# Patient Record
Sex: FEMALE | Race: WHITE | NOT HISPANIC OR LATINO | Employment: FULL TIME | URBAN - METROPOLITAN AREA
[De-identification: names, ages, dates, MRNs, and addresses within clinical notes are randomized per-mention and may not be internally consistent; named-entity substitution may affect disease eponyms.]

---

## 2017-01-25 ENCOUNTER — OFFICE VISIT (OUTPATIENT)
Dept: CARDIOLOGY | Facility: MEDICAL CENTER | Age: 62
End: 2017-01-25
Payer: COMMERCIAL

## 2017-01-25 VITALS
DIASTOLIC BLOOD PRESSURE: 70 MMHG | OXYGEN SATURATION: 98 % | SYSTOLIC BLOOD PRESSURE: 114 MMHG | HEIGHT: 66 IN | WEIGHT: 160 LBS | BODY MASS INDEX: 25.71 KG/M2 | HEART RATE: 58 BPM

## 2017-01-25 DIAGNOSIS — E05.90 HYPERTHYROIDISM: ICD-10-CM

## 2017-01-25 DIAGNOSIS — G89.29 CHRONIC BILATERAL LOW BACK PAIN WITHOUT SCIATICA: ICD-10-CM

## 2017-01-25 DIAGNOSIS — I48.0 PAROXYSMAL ATRIAL FIBRILLATION (HCC): ICD-10-CM

## 2017-01-25 DIAGNOSIS — M54.50 CHRONIC BILATERAL LOW BACK PAIN WITHOUT SCIATICA: ICD-10-CM

## 2017-01-25 PROCEDURE — 99214 OFFICE O/P EST MOD 30 MIN: CPT | Performed by: NURSE PRACTITIONER

## 2017-01-25 RX ORDER — TRAZODONE HYDROCHLORIDE 50 MG/1
50 TABLET ORAL NIGHTLY
COMMUNITY
End: 2020-04-23

## 2017-01-25 RX ORDER — FLECAINIDE ACETATE 50 MG/1
100 TABLET ORAL PRN
Qty: 60 TAB | Refills: 11 | Status: SHIPPED | OUTPATIENT
Start: 2017-01-25 | End: 2020-04-23

## 2017-01-25 RX ORDER — ASPIRIN 81 MG/1
81 TABLET, CHEWABLE ORAL DAILY
Qty: 100 TAB | Refills: 3 | Status: ON HOLD | COMMUNITY
Start: 2017-01-25 | End: 2020-04-23

## 2017-01-25 ASSESSMENT — ENCOUNTER SYMPTOMS
WEAKNESS: 0
MYALGIAS: 0
ABDOMINAL PAIN: 0
DIZZINESS: 0
PND: 0
COUGH: 0
ORTHOPNEA: 0
BACK PAIN: 1
PALPITATIONS: 1
SHORTNESS OF BREATH: 1
CLAUDICATION: 0

## 2017-01-25 NOTE — PROGRESS NOTES
Subjective:   Emily Peter is a 61 y.o. female who presents today for annual follow-up on paroxysmal atrial fibrillation. She was last seen by Dr. Elam in November 2015. At that time, he had her take flecanide when necessary episodes of atrial fibrillation. The last episode that she had was in the summer of 2016 at which time her heart rate was 170 and she felt short of breath. She noted that taking only 50 mg the flecanide was not enough to convert her back to normal rhythm. She feels that she needs to take 100-200 mg which is what he recommended.    She's not had any further episodes of atrial fib. She admits to increased stress right now as she works for the [employer] which is about to go into session. She is not exercising regularly but is active at work.    She has hyperthyroidism which is being followed by endocrinologist in [place].    She feels generally well and denies any shortness of breath, chest discomfort or ankle edema. She complains of some chronic back pain for which she takes Aleve.    Past Medical History   Diagnosis Date   • Atrial fibrillation (CMS-HCC)      distant hx of   • Hypothyroid      History reviewed. No pertinent past surgical history.  History reviewed. No pertinent family history.  History   Smoking status   • Never Smoker    Smokeless tobacco   • Never Used     Allergies   Allergen Reactions   • Cardizem Unspecified     Can cause the heart to stop for 2 seconds      Outpatient Encounter Prescriptions as of 1/25/2017   Medication Sig Dispense Refill   • CycloSPORINE (RESTASIS OP) 1 Drop by Ophthalmic route 2 Times a Day.     • trazodone (DESYREL) 50 MG Tab Take 50 mg by mouth every evening. Or PRN USE     • flecainide (TAMBOCOR) 50 MG tablet Take 2 Tabs by mouth as needed. For atrial fibrillation.  May take up to 200 mg twice daily as needed. 60 Tab 11   • aspirin (ASA) 81 MG Chew Tab chewable tablet Take 1 Tab by mouth every day. 100 Tab 3   • nadolol  "(CORGARD) 40 MG Tab TAKE ONE TABLET BY MOUTH DAILY 90 Tab 0   • Levothyroxine Sodium (TIROSINT) 150 MCG CAPS Take 150 mcg by mouth every day.     • liothyronine (CYTOMEL) 25 MCG TABS Take 12.5 mcg by mouth every day.     • [DISCONTINUED] erythromycin 5 MG/GM Ointment Place 1 Application in left eye 6 Times a Day. Apply small amount twice a day (Patient not taking: Reported on 1/25/2017) 1 Tube 1     No facility-administered encounter medications on file as of 1/25/2017.     Review of Systems   Constitutional: Negative for malaise/fatigue.   Respiratory: Positive for shortness of breath (with exertion when in A fib). Negative for cough.    Cardiovascular: Positive for palpitations (when in A fib). Negative for chest pain, orthopnea, claudication, leg swelling and PND.   Gastrointestinal: Negative for abdominal pain.   Musculoskeletal: Positive for back pain (chronic.). Negative for myalgias.   Neurological: Negative for dizziness and weakness.        Objective:   /70 mmHg  Pulse 58  Ht 1.676 m (5' 5.98\")  Wt 72.576 kg (160 lb)  BMI 25.84 kg/m2  SpO2 98%    Physical Exam   Constitutional: She is oriented to person, place, and time. She appears well-developed and well-nourished.   HENT:   Head: Normocephalic.   Eyes: Conjunctivae are normal.   Neck: No JVD present. No thyromegaly present.   Cardiovascular: Normal rate, regular rhythm, S1 normal, S2 normal and normal heart sounds.  Exam reveals no gallop, no S3, no S4 and no friction rub.    No murmur heard.  Pulmonary/Chest: Effort normal and breath sounds normal. No respiratory distress. She has no wheezes. She has no rales.   Abdominal: Soft. Bowel sounds are normal. She exhibits no distension. There is no tenderness.   Musculoskeletal: She exhibits no edema.   Neurological: She is alert and oriented to person, place, and time.   Skin: Skin is warm and dry.   Psychiatric: She has a normal mood and affect.     No recent lab.    Assessment:     1. Paroxysmal " atrial fibrillation (CMS-HCC)  flecainide (TAMBOCOR) 50 MG tablet   2. Hyperthyroidism     3. Chronic bilateral low back pain without sciatica         Medical Decision Making:  Today's Assessment / Status / Plan:     Paroxysmal atrial fibrillation: no recent episodes. I will refill the flecanide and she can take anywhere from  mg as needed when she gets and atrial fibrillation. She knows that if she is in this rhythm for an extended period of time to go to the emergency room for potential cardioversion. She had been taking aspirin 3 days a week. I recommend aspirin 81 mg daily. She will take this with food.    Hyperthyroidism: Be contributing to her episodes of atrial fibrillation. She will follow with her endocrinologist.    Low back pain: I recommended she try Tylenol first and then Aleve. She will take the Aleve with food.    She is stable enough to follow up in one year with Dr. Elam or myself. She will follow-up sooner if she has frequent atrial fibrillation.    Collaborating Provider: Dr. Veronique Booth.

## 2017-01-25 NOTE — Clinical Note
[facility]   [address] [address]  [place] [address]  Phone: [phone]  Fax: [phone]              Emily Peter  1955    Encounter Date: 1/25/2017    YANI Francis          PROGRESS NOTE:  Subjective:   Emily Peter is a 61 y.o. female who presents today for annual follow-up on paroxysmal atrial fibrillation. She was last seen by Dr. Elam in November 2015. At that time, he had her take flecanide when necessary episodes of atrial fibrillation. The last episode that she had was in the summer of 2016 at which time her heart rate was 170 and she felt short of breath. She noted that taking only 50 mg the flecanide was not enough to convert her back to normal rhythm. She feels that she needs to take 100-200 mg which is what he recommended.    She's not had any further episodes of atrial fib. She admits to increased stress right now as she works for the [employer] which is about to go into session. She is not exercising regularly but is active at work.    She has hyperthyroidism which is being followed by endocrinologist in [place].    She feels generally well and denies any shortness of breath, chest discomfort or ankle edema. She complains of some chronic back pain for which she takes Aleve.    Past Medical History   Diagnosis Date   • Atrial fibrillation (CMS-HCC)      distant hx of   • Hypothyroid      History reviewed. No pertinent past surgical history.  History reviewed. No pertinent family history.  History   Smoking status   • Never Smoker    Smokeless tobacco   • Never Used     Allergies   Allergen Reactions   • Cardizem Unspecified     Can cause the heart to stop for 2 seconds      Outpatient Encounter Prescriptions as of 1/25/2017   Medication Sig Dispense Refill   • CycloSPORINE (RESTASIS OP) 1 Drop by Ophthalmic route 2 Times a Day.     • trazodone (DESYREL) 50 MG Tab Take 50 mg by mouth every evening. Or  "PRN USE     • flecainide (TAMBOCOR) 50 MG tablet Take 2 Tabs by mouth as needed. For atrial fibrillation.  May take up to 200 mg twice daily as needed. 60 Tab 11   • aspirin (ASA) 81 MG Chew Tab chewable tablet Take 1 Tab by mouth every day. 100 Tab 3   • nadolol (CORGARD) 40 MG Tab TAKE ONE TABLET BY MOUTH DAILY 90 Tab 0   • Levothyroxine Sodium (TIROSINT) 150 MCG CAPS Take 150 mcg by mouth every day.     • liothyronine (CYTOMEL) 25 MCG TABS Take 12.5 mcg by mouth every day.     • [DISCONTINUED] erythromycin 5 MG/GM Ointment Place 1 Application in left eye 6 Times a Day. Apply small amount twice a day (Patient not taking: Reported on 1/25/2017) 1 Tube 1     No facility-administered encounter medications on file as of 1/25/2017.     Review of Systems   Constitutional: Negative for malaise/fatigue.   Respiratory: Positive for shortness of breath (with exertion when in A fib). Negative for cough.    Cardiovascular: Positive for palpitations (when in A fib). Negative for chest pain, orthopnea, claudication, leg swelling and PND.   Gastrointestinal: Negative for abdominal pain.   Musculoskeletal: Positive for back pain (chronic.). Negative for myalgias.   Neurological: Negative for dizziness and weakness.        Objective:   /70 mmHg  Pulse 58  Ht 1.676 m (5' 5.98\")  Wt 72.576 kg (160 lb)  BMI 25.84 kg/m2  SpO2 98%    Physical Exam   Constitutional: She is oriented to person, place, and time. She appears well-developed and well-nourished.   HENT:   Head: Normocephalic.   Eyes: Conjunctivae are normal.   Neck: No JVD present. No thyromegaly present.   Cardiovascular: Normal rate, regular rhythm, S1 normal, S2 normal and normal heart sounds.  Exam reveals no gallop, no S3, no S4 and no friction rub.    No murmur heard.  Pulmonary/Chest: Effort normal and breath sounds normal. No respiratory distress. She has no wheezes. She has no rales.   Abdominal: Soft. Bowel sounds are normal. She exhibits no distension. " There is no tenderness.   Musculoskeletal: She exhibits no edema.   Neurological: She is alert and oriented to person, place, and time.   Skin: Skin is warm and dry.   Psychiatric: She has a normal mood and affect.     No recent lab.    Assessment:     1. Paroxysmal atrial fibrillation (CMS-HCC)  flecainide (TAMBOCOR) 50 MG tablet   2. Hyperthyroidism     3. Chronic bilateral low back pain without sciatica         Medical Decision Making:  Today's Assessment / Status / Plan:     Paroxysmal atrial fibrillation: no recent episodes. I will refill the flecanide and she can take anywhere from  mg as needed when she gets and atrial fibrillation. She knows that if she is in this rhythm for an extended period of time to go to the emergency room for potential cardioversion. She had been taking aspirin 3 days a week. I recommend aspirin 81 mg daily. She will take this with food.    Hyperthyroidism: Be contributing to her episodes of atrial fibrillation. She will follow with her endocrinologist.    Low back pain: I recommended she try Tylenol first and then Aleve. She will take the Aleve with food.    She is stable enough to follow up in one year with Dr. Elam or myself. She will follow-up sooner if she has frequent atrial fibrillation.    Collaborating Provider: Dr. Veronique Booth.        No Recipients

## 2017-01-25 NOTE — MR AVS SNAPSHOT
"        Emily Peter   2017 2:20 PM   Office Visit   MRN: [id number]    Department:  [facility]   Dept Phone:  [phone]    Description:  Female : 1955   Provider:  YANI Francis           Reason for Visit     Follow-Up Patient is here for a follow up and reaction to AFIB medication. When has bouts with AFIB, unknown at times and when coming out of it, patient becomes bradycardia at times. Symptoms waxes and wanes.      Allergies as of 2017     Allergen Noted Reactions    Cardizem 09/15/2015   Unspecified    Can cause the heart to stop for 2 seconds       You were diagnosed with     Paroxysmal atrial fibrillation (CMS-HCC)   [822846]       Hyperthyroidism   [683865]         Vital Signs     Blood Pressure Pulse Height Weight Body Mass Index Oxygen Saturation    114/70 mmHg 58 1.676 m (5' 5.98\") 72.576 kg (160 lb) 25.84 kg/m2 98%    Smoking Status                   Never Smoker            Basic Information     Date Of Birth Sex Race Ethnicity Preferred Language    1955 Female White Non- English      Problem List              ICD-10-CM Priority Class Noted - Resolved    Hyperthyroidism E05.90   2015 - Present    A-fib (CMS-[facility]) I48.91   2015 - Present      Health Maintenance        Date Due Completion Dates    IMM DTaP/Tdap/Td Vaccine (1 - Tdap) 1975 ---    PAP SMEAR 1977 ---    COLONOSCOPY 2006 ---    IMM ZOSTER VACCINE 2016 ---    IMM INFLUENZA (1) 2016    MAMMOGRAM 3/8/2017 3/8/2016, 2014, 11/3/2011, 2009, 2009, 2008, 2008, 2006, 2005            Current Immunizations     Influenza TIV (IM) 2014      Below and/or attached are the medications your provider expects you to take. Review all of your home medications and newly ordered medications with your provider and/or pharmacist. Follow medication instructions as directed by your provider and/or pharmacist. Please keep your medication " list with you and share with your provider. Update the information when medications are discontinued, doses are changed, or new medications (including over-the-counter products) are added; and carry medication information at all times in the event of emergency situations     Allergies:  CARDIZEM - Unspecified               Medications  Valid as of: January 25, 2017 -  3:36 PM    Generic Name Brand Name Tablet Size Instructions for use    Aspirin (Chew Tab) ASA 81 MG Take 1 Tab by mouth every day.        CycloSPORINE   1 Drop by Ophthalmic route 2 Times a Day.        Flecainide Acetate (Tab) TAMBOCOR 50 MG Take 2 Tabs by mouth as needed. For atrial fibrillation.  May take up to 200 mg twice daily as needed.        Levothyroxine Sodium (Cap) Levothyroxine Sodium 150 MCG Take 150 mcg by mouth every day.        Liothyronine Sodium (Tab) CYTOMEL 25 MCG Take 12.5 mcg by mouth every day.        Nadolol (Tab) CORGARD 40 MG TAKE ONE TABLET BY MOUTH DAILY        TraZODone HCl (Tab) DESYREL 50 MG Take 50 mg by mouth every evening. Or PRN USE        .                 Medicines prescribed today were sent to:     [facility] #[id number] - [facility] [address] [address]    Phone: [phone] Fax: [phone]    Open 24 Hours?: No      Medication refill instructions:       If your prescription bottle indicates you have medication refills left, it is not necessary to call your provider’s office. Please contact your pharmacy and they will refill your medication.    If your prescription bottle indicates you do not have any refills left, you may request refills at any time through one of the following ways: The online [employer] system (except Urgent Care), by calling your provider’s office, or by asking your pharmacy to contact your provider’s office with a refill request. Medication refills are processed only during regular business hours and may not be available until the next business  day. Your provider may request additional information or to have a follow-up visit with you prior to refilling your medication.   *Please Note: Medication refills are assigned a new Rx number when refilled electronically. Your pharmacy may indicate that no refills were authorized even though a new prescription for the same medication is available at the pharmacy. Please request the medicine by name with the pharmacy before contacting your provider for a refill.           [employer] Access Code: Activation code not generated  Current [email or web address] Status: Active

## 2017-02-25 ENCOUNTER — HOSPITAL ENCOUNTER (OUTPATIENT)
Dept: LAB | Facility: MEDICAL CENTER | Age: 62
End: 2017-02-25
Attending: INTERNAL MEDICINE
Payer: COMMERCIAL

## 2017-02-25 LAB
ALBUMIN SERPL BCP-MCNC: 4.3 G/DL (ref 3.2–4.9)
ALBUMIN/GLOB SERPL: 1.4 G/DL
ALP SERPL-CCNC: 64 U/L (ref 30–99)
ALT SERPL-CCNC: 22 U/L (ref 2–50)
ANION GAP SERPL CALC-SCNC: 9 MMOL/L (ref 0–11.9)
AST SERPL-CCNC: 20 U/L (ref 12–45)
BASOPHILS # BLD AUTO: 0.03 K/UL (ref 0–0.12)
BASOPHILS NFR BLD AUTO: 0.6 % (ref 0–1.8)
BILIRUB SERPL-MCNC: 0.5 MG/DL (ref 0.1–1.5)
BUN SERPL-MCNC: 10 MG/DL (ref 8–22)
CALCIUM SERPL-MCNC: 9.9 MG/DL (ref 8.5–10.5)
CHLORIDE SERPL-SCNC: 104 MMOL/L (ref 96–112)
CHOLEST SERPL-MCNC: 199 MG/DL (ref 100–199)
CO2 SERPL-SCNC: 30 MMOL/L (ref 20–33)
CREAT SERPL-MCNC: 0.67 MG/DL (ref 0.5–1.4)
EOSINOPHIL # BLD: 0.09 K/UL (ref 0–0.51)
EOSINOPHIL NFR BLD AUTO: 1.7 % (ref 0–6.9)
ERYTHROCYTE [DISTWIDTH] IN BLOOD BY AUTOMATED COUNT: 37.1 FL (ref 35.9–50)
GLOBULIN SER CALC-MCNC: 3.1 G/DL (ref 1.9–3.5)
GLUCOSE SERPL-MCNC: 92 MG/DL (ref 65–99)
HCT VFR BLD AUTO: 46.3 % (ref 37–47)
HDLC SERPL-MCNC: 51 MG/DL
HGB BLD-MCNC: 15.3 G/DL (ref 12–16)
IMM GRANULOCYTES # BLD AUTO: 0.01 K/UL (ref 0–0.11)
IMM GRANULOCYTES NFR BLD AUTO: 0.2 % (ref 0–0.9)
LDLC SERPL CALC-MCNC: 133 MG/DL
LYMPHOCYTES # BLD: 2.03 K/UL (ref 1–4.8)
LYMPHOCYTES NFR BLD AUTO: 37.5 % (ref 22–41)
MCH RBC QN AUTO: 27.5 PG (ref 27–33)
MCHC RBC AUTO-ENTMCNC: 33 G/DL (ref 33.6–35)
MCV RBC AUTO: 83.3 FL (ref 81.4–97.8)
MONOCYTES # BLD: 0.33 K/UL (ref 0–0.85)
MONOCYTES NFR BLD AUTO: 6.1 % (ref 0–13.4)
NEUTROPHILS # BLD: 2.93 K/UL (ref 2–7.15)
NEUTROPHILS NFR BLD AUTO: 53.9 % (ref 44–72)
NRBC # BLD AUTO: 0 K/UL
NRBC BLD-RTO: 0 /100 WBC
PLATELET # BLD AUTO: 317 K/UL (ref 164–446)
PMV BLD AUTO: 10.6 FL (ref 9–12.9)
POTASSIUM SERPL-SCNC: 4.3 MMOL/L (ref 3.6–5.5)
PROT SERPL-MCNC: 7.4 G/DL (ref 6–8.2)
RBC # BLD AUTO: 5.56 M/UL (ref 4.2–5.4)
SODIUM SERPL-SCNC: 143 MMOL/L (ref 135–145)
T3FREE SERPL-MCNC: 5.43 PG/ML (ref 2.4–4.2)
T4 FREE SERPL-MCNC: 2.13 NG/DL (ref 0.53–1.43)
THYROPEROXIDASE AB SERPL-ACNC: 2.1 IU/ML (ref 0–9)
TRIGL SERPL-MCNC: 74 MG/DL (ref 0–149)
TSH SERPL DL<=0.005 MIU/L-ACNC: <0.015 UIU/ML (ref 0.3–3.7)
WBC # BLD AUTO: 5.4 K/UL (ref 4.8–10.8)

## 2017-02-25 PROCEDURE — 84445 ASSAY OF TSI GLOBULIN: CPT

## 2017-02-25 PROCEDURE — 80053 COMPREHEN METABOLIC PANEL: CPT

## 2017-02-25 PROCEDURE — 80061 LIPID PANEL: CPT

## 2017-02-25 PROCEDURE — 85025 COMPLETE CBC W/AUTO DIFF WBC: CPT

## 2017-02-25 PROCEDURE — 84481 FREE ASSAY (FT-3): CPT

## 2017-02-25 PROCEDURE — 36415 COLL VENOUS BLD VENIPUNCTURE: CPT

## 2017-02-25 PROCEDURE — 84439 ASSAY OF FREE THYROXINE: CPT

## 2017-02-25 PROCEDURE — 84443 ASSAY THYROID STIM HORMONE: CPT

## 2017-02-25 PROCEDURE — 86376 MICROSOMAL ANTIBODY EACH: CPT

## 2017-03-02 LAB — TSI ACT/NOR SER: 83 %

## 2017-03-22 DIAGNOSIS — I48.0 PAF (PAROXYSMAL ATRIAL FIBRILLATION) (HCC): ICD-10-CM

## 2017-03-22 RX ORDER — NADOLOL 40 MG/1
40 TABLET ORAL DAILY
Qty: 90 TAB | Refills: 3 | Status: SHIPPED | OUTPATIENT
Start: 2017-03-22 | End: 2018-05-30 | Stop reason: SDUPTHER

## 2017-07-20 ENCOUNTER — HOSPITAL ENCOUNTER (OUTPATIENT)
Dept: LAB | Facility: MEDICAL CENTER | Age: 62
End: 2017-07-20
Attending: INTERNAL MEDICINE
Payer: COMMERCIAL

## 2017-07-20 LAB
ALBUMIN SERPL BCP-MCNC: 4.3 G/DL (ref 3.2–4.9)
ALBUMIN/GLOB SERPL: 1.5 G/DL
ALP SERPL-CCNC: 70 U/L (ref 30–99)
ALT SERPL-CCNC: 20 U/L (ref 2–50)
ANION GAP SERPL CALC-SCNC: 7 MMOL/L (ref 0–11.9)
AST SERPL-CCNC: 19 U/L (ref 12–45)
BASOPHILS # BLD AUTO: 0.9 % (ref 0–1.8)
BASOPHILS # BLD: 0.05 K/UL (ref 0–0.12)
BILIRUB SERPL-MCNC: 0.5 MG/DL (ref 0.1–1.5)
BUN SERPL-MCNC: 11 MG/DL (ref 8–22)
CALCIUM SERPL-MCNC: 9.9 MG/DL (ref 8.5–10.5)
CHLORIDE SERPL-SCNC: 105 MMOL/L (ref 96–112)
CHOLEST SERPL-MCNC: 186 MG/DL (ref 100–199)
CO2 SERPL-SCNC: 28 MMOL/L (ref 20–33)
CREAT SERPL-MCNC: 0.62 MG/DL (ref 0.5–1.4)
EOSINOPHIL # BLD AUTO: 0.14 K/UL (ref 0–0.51)
EOSINOPHIL NFR BLD: 2.5 % (ref 0–6.9)
ERYTHROCYTE [DISTWIDTH] IN BLOOD BY AUTOMATED COUNT: 39.2 FL (ref 35.9–50)
EST. AVERAGE GLUCOSE BLD GHB EST-MCNC: 108 MG/DL
GFR SERPL CREATININE-BSD FRML MDRD: >60 ML/MIN/1.73 M 2
GLOBULIN SER CALC-MCNC: 2.9 G/DL (ref 1.9–3.5)
GLUCOSE SERPL-MCNC: 80 MG/DL (ref 65–99)
HBA1C MFR BLD: 5.4 % (ref 0–5.6)
HCT VFR BLD AUTO: 45 % (ref 37–47)
HDLC SERPL-MCNC: 55 MG/DL
HGB BLD-MCNC: 14.8 G/DL (ref 12–16)
IMM GRANULOCYTES # BLD AUTO: 0.02 K/UL (ref 0–0.11)
IMM GRANULOCYTES NFR BLD AUTO: 0.4 % (ref 0–0.9)
LDLC SERPL CALC-MCNC: 112 MG/DL
LYMPHOCYTES # BLD AUTO: 1.85 K/UL (ref 1–4.8)
LYMPHOCYTES NFR BLD: 32.8 % (ref 22–41)
MCH RBC QN AUTO: 27.6 PG (ref 27–33)
MCHC RBC AUTO-ENTMCNC: 32.9 G/DL (ref 33.6–35)
MCV RBC AUTO: 84 FL (ref 81.4–97.8)
MONOCYTES # BLD AUTO: 0.38 K/UL (ref 0–0.85)
MONOCYTES NFR BLD AUTO: 6.7 % (ref 0–13.4)
NEUTROPHILS # BLD AUTO: 3.2 K/UL (ref 2–7.15)
NEUTROPHILS NFR BLD: 56.7 % (ref 44–72)
NRBC # BLD AUTO: 0 K/UL
NRBC BLD AUTO-RTO: 0 /100 WBC
PLATELET # BLD AUTO: 305 K/UL (ref 164–446)
PMV BLD AUTO: 11.1 FL (ref 9–12.9)
POTASSIUM SERPL-SCNC: 4.1 MMOL/L (ref 3.6–5.5)
PROT SERPL-MCNC: 7.2 G/DL (ref 6–8.2)
RBC # BLD AUTO: 5.36 M/UL (ref 4.2–5.4)
SODIUM SERPL-SCNC: 140 MMOL/L (ref 135–145)
T3FREE SERPL-MCNC: 3.8 PG/ML (ref 2.4–4.2)
T4 FREE SERPL-MCNC: 1.38 NG/DL (ref 0.53–1.43)
THYROPEROXIDASE AB SERPL-ACNC: 3.2 IU/ML (ref 0–9)
TRIGL SERPL-MCNC: 93 MG/DL (ref 0–149)
TSH SERPL DL<=0.005 MIU/L-ACNC: <0.015 UIU/ML (ref 0.3–3.7)
WBC # BLD AUTO: 5.6 K/UL (ref 4.8–10.8)

## 2017-07-20 PROCEDURE — 86376 MICROSOMAL ANTIBODY EACH: CPT

## 2017-07-20 PROCEDURE — 84443 ASSAY THYROID STIM HORMONE: CPT

## 2017-07-20 PROCEDURE — 85025 COMPLETE CBC W/AUTO DIFF WBC: CPT

## 2017-07-20 PROCEDURE — 80061 LIPID PANEL: CPT

## 2017-07-20 PROCEDURE — 80053 COMPREHEN METABOLIC PANEL: CPT

## 2017-07-20 PROCEDURE — 36415 COLL VENOUS BLD VENIPUNCTURE: CPT

## 2017-07-20 PROCEDURE — 84439 ASSAY OF FREE THYROXINE: CPT

## 2017-07-20 PROCEDURE — 84481 FREE ASSAY (FT-3): CPT

## 2017-07-20 PROCEDURE — 83036 HEMOGLOBIN GLYCOSYLATED A1C: CPT

## 2017-08-22 ENCOUNTER — TELEPHONE (OUTPATIENT)
Dept: CARDIOLOGY | Facility: MEDICAL CENTER | Age: 62
End: 2017-08-22

## 2017-08-22 NOTE — TELEPHONE ENCOUNTER
----- Message from Kristen Lopez sent at 8/22/2017 11:29 AM PDT -----  Regarding: patient feels like her heart is racing  KOTA/Nessa      Patient wants to discuss her medications. She says her heart feels like it is racing, and she thinks it's from her medication. She can be reached at work at [phone].

## 2017-08-22 NOTE — TELEPHONE ENCOUNTER
"Patient states her HR has been slow, she had an episode in which she felt she was going to \"blackout\" while she was sitting.  She does not monitor her BP, but states her HR averages from 48 to 90, but she thinks it is slower at rest.  She gets her HR from her exercise equipment, so I  don't think she really knows what her resting HR is.   She feels her Corgard 40mg daily causes her slow HR, but when she cuts it back she has rapid rates.  She has not needed to take her PRN flecainide.  To KOTA APCAROL to please advise.   "

## 2017-08-23 NOTE — TELEPHONE ENCOUNTER
Patient informed and states she is going out of town and will look into purchasing a BP monitor when she gets back.

## 2017-08-23 NOTE — TELEPHONE ENCOUNTER
She can try a reduction in the Corgard to 20 mg daily. I would rather have her take this in the evening than in the morning. To get an accurate heart rate she would need to count her pulse for 15 or 30 seconds and multiply to get a heart rate for a minute. I'd like her to monitor her blood pressure and heart rate morning and evening. Reduction in the Corgard won't necessarily put her into atrial fibrillation. If she does get into atrial fibrillation she can always take the flecanide. Call back in 2 weeks with blood pressures and heart rates.

## 2017-09-05 ENCOUNTER — HOSPITAL ENCOUNTER (OUTPATIENT)
Dept: LAB | Facility: MEDICAL CENTER | Age: 62
End: 2017-09-05
Attending: NURSE PRACTITIONER
Payer: COMMERCIAL

## 2017-09-05 PROCEDURE — 88175 CYTOPATH C/V AUTO FLUID REDO: CPT

## 2017-09-05 PROCEDURE — 87624 HPV HI-RISK TYP POOLED RSLT: CPT

## 2017-09-06 LAB
CYTOLOGY REG CYTOL: NORMAL
HPV HR 12 DNA CVX QL NAA+PROBE: NEGATIVE
HPV16 DNA SPEC QL NAA+PROBE: NEGATIVE
HPV18 DNA SPEC QL NAA+PROBE: NEGATIVE
SPECIMEN SOURCE: NORMAL

## 2017-09-15 ENCOUNTER — TELEPHONE (OUTPATIENT)
Dept: CARDIOLOGY | Facility: MEDICAL CENTER | Age: 62
End: 2017-09-15

## 2017-09-15 NOTE — TELEPHONE ENCOUNTER
"Patient states she attempted to decrease her Corgard to 20mg/d at night as advised, but she didn't feel well at all and states she went into \"full blown AFib\".  She went back up to 40mg/d and had bradycardia.  She is currently \"playing with her dose, alternating between 20mg and 40mg.  This week she has taken 20mg every morning and her HR has been 48 to 50 BPM consistently.  She feels fatigued and sometimes SOB with exertion at this rate.  She didn't think her new BP cuff was accurate so she returned it and is thinking about a wrist monitor.  Advised to purchase the [employer] arm cuff monitor with the easy to apply cuff and memory.  She agrees and will bring it in to check.  To KOTA GOLDMAN to please advise on Corgard dose and low HR with symptoms.     "

## 2017-09-15 NOTE — TELEPHONE ENCOUNTER
"----- Message from Sami Fredis sent at 9/15/2017  1:51 PM PDT -----  Regarding: Pt calling back with updated information   Contact: [phone]  KOTA/Nessa    Pt calling back with updated information. States she bought BP monitor, but per pt she thought readings were\" inaccurate\" therefore returned it. She does have monitor that reads \"EKG's\" called Puneet Wasserman has report. Please call pt at [phone].   "

## 2017-09-18 NOTE — TELEPHONE ENCOUNTER
Have patient schedule an appointment as we probably need to change her to metoprolol or something else.    I agree with the arm cuff. If she is using the heart rate on the arm cuff, it may be an accurate if she is in atrial fibrillation.

## 2017-09-18 NOTE — TELEPHONE ENCOUNTER
S/W PT and discussed  recommendations. PT is getting ready to leave for a conference. PT will call when she is back to make an appointment.   Maria De Jesus XIAO RN

## 2018-01-20 ENCOUNTER — HOSPITAL ENCOUNTER (OUTPATIENT)
Dept: RADIOLOGY | Facility: MEDICAL CENTER | Age: 63
End: 2018-01-20
Attending: SPECIALIST
Payer: COMMERCIAL

## 2018-01-20 DIAGNOSIS — Z12.31 SCREENING MAMMOGRAM, ENCOUNTER FOR: ICD-10-CM

## 2018-01-20 PROCEDURE — 77067 SCR MAMMO BI INCL CAD: CPT

## 2018-05-30 DIAGNOSIS — I48.0 PAF (PAROXYSMAL ATRIAL FIBRILLATION) (HCC): ICD-10-CM

## 2018-05-30 RX ORDER — NADOLOL 40 MG/1
40 TABLET ORAL DAILY
Qty: 90 TAB | Refills: 0 | Status: ON HOLD | OUTPATIENT
Start: 2018-05-30 | End: 2020-04-23 | Stop reason: SDUPTHER

## 2018-07-31 ENCOUNTER — TELEPHONE (OUTPATIENT)
Dept: CARDIOLOGY | Facility: PHYSICIAN GROUP | Age: 63
End: 2018-07-31

## 2018-08-01 ENCOUNTER — TELEPHONE (OUTPATIENT)
Dept: CARDIOLOGY | Facility: MEDICAL CENTER | Age: 63
End: 2018-08-01

## 2018-08-01 NOTE — TELEPHONE ENCOUNTER
Good Morning,     Patient was contacted twice and we left messages to adv a follow up is needed. No call back received.

## 2018-10-02 ENCOUNTER — HOSPITAL ENCOUNTER (OUTPATIENT)
Dept: LAB | Facility: MEDICAL CENTER | Age: 63
End: 2018-10-02
Attending: NURSE PRACTITIONER
Payer: COMMERCIAL

## 2018-10-02 PROCEDURE — 87624 HPV HI-RISK TYP POOLED RSLT: CPT

## 2018-10-02 PROCEDURE — 88175 CYTOPATH C/V AUTO FLUID REDO: CPT

## 2020-01-09 ENCOUNTER — TELEPHONE (OUTPATIENT)
Dept: CARDIOLOGY | Facility: MEDICAL CENTER | Age: 65
End: 2020-01-09

## 2020-01-09 NOTE — TELEPHONE ENCOUNTER
Spoke w/ pt. Regarding upcoming appt. W/ PADDY on 1/15. Pt. Has had recent labs done at [facility], I have requested these. She has not had any recent imaging or EKG.     Pt's son is also seeing JM on 1/15. She states he was recently seen at [facility] and followed up w/ his pediatric cardiologist Dr. Allison, I have sent records requests to both.

## 2020-01-15 ENCOUNTER — OFFICE VISIT (OUTPATIENT)
Dept: CARDIOLOGY | Facility: MEDICAL CENTER | Age: 65
End: 2020-01-15
Payer: COMMERCIAL

## 2020-01-15 VITALS
HEIGHT: 67 IN | SYSTOLIC BLOOD PRESSURE: 140 MMHG | DIASTOLIC BLOOD PRESSURE: 64 MMHG | OXYGEN SATURATION: 95 % | HEART RATE: 46 BPM | WEIGHT: 172 LBS | BODY MASS INDEX: 27 KG/M2

## 2020-01-15 DIAGNOSIS — I48.0 PAROXYSMAL ATRIAL FIBRILLATION (HCC): ICD-10-CM

## 2020-01-15 DIAGNOSIS — E03.0 CONGENITAL HYPOTHYROIDISM WITH DIFFUSE GOITER: ICD-10-CM

## 2020-01-15 PROCEDURE — 99215 OFFICE O/P EST HI 40 MIN: CPT | Performed by: INTERNAL MEDICINE

## 2020-01-15 RX ORDER — VITAMIN E 268 MG
400 CAPSULE ORAL DAILY
COMMUNITY

## 2020-01-15 RX ORDER — LEVOTHYROXINE SODIUM 88 UG/1
88 TABLET ORAL
COMMUNITY
End: 2020-01-15

## 2020-01-15 RX ORDER — VALACYCLOVIR HYDROCHLORIDE 1 G/1
TABLET, FILM COATED ORAL
COMMUNITY
Start: 2019-12-26 | End: 2020-04-23

## 2020-01-15 RX ORDER — COVID-19 ANTIGEN TEST
KIT MISCELLANEOUS
Status: ON HOLD | COMMUNITY
End: 2020-04-23

## 2020-01-15 RX ORDER — LEVOTHYROXINE SODIUM 75 UG/1
75 CAPSULE ORAL
Status: ON HOLD | DISCHARGE
Start: 2020-01-15 | End: 2020-04-23

## 2020-01-15 RX ORDER — LEVOTHYROXINE SODIUM 88 UG/1
88 CAPSULE ORAL
Qty: 30 CAP | Status: ON HOLD | DISCHARGE
Start: 2020-01-15 | End: 2020-04-23

## 2020-01-15 RX ORDER — LEVOTHYROXINE SODIUM 0.07 MG/1
75 TABLET ORAL
COMMUNITY
End: 2020-01-15

## 2020-01-15 RX ORDER — ESTRADIOL 0.1 MG/G
CREAM VAGINAL
COMMUNITY
Start: 2019-10-29 | End: 2020-04-23

## 2020-01-15 RX ORDER — MULTIVIT-MIN/IRON/FOLIC ACID/K 18-600-40
2000 CAPSULE ORAL DAILY
COMMUNITY

## 2020-01-15 ASSESSMENT — ENCOUNTER SYMPTOMS
DIZZINESS: 1
PALPITATIONS: 1
BACK PAIN: 1
PARESTHESIAS: 1
NERVOUS/ANXIOUS: 1
HEADACHES: 1
NECK PAIN: 1
NAUSEA: 1

## 2020-01-15 NOTE — PROGRESS NOTES
Cardiology Follow-up Consultation Note    Date of note:    1/15/2020    Primary Care Provider: Ricardo Wu M.D.  Referring Provider: Ricardo Wu M.    Patient Name: Emily Peter   YOB: 1955  MRN:              [id number]    Chief Complaint: atrial fibrillation/palpitations    History of Present Illness: Emily Peter is a 64 y.o. female whose current medical problems include paroxysmal atrial fibrillation and hypothyroidism who is here for follow-up.    Interim Events:  Does have atrial fibrillation for around one 24 hour period one a month. Flecainide used to help, now doesn't seem to at all. Manifests as moderate severity substernal palpitations associated with chest pressure and SOB. No syncope.  No exertional chest pain. Does exercise.     Resting HR is in the 40s. HR in a fib even after flec is in the 150s based on her Kardia monitor.     Review of Systems   Cardiovascular: Positive for palpitations.   Musculoskeletal: Positive for back pain, joint pain and neck pain.   Gastrointestinal: Positive for nausea.   Neurological: Positive for dizziness, headaches and paresthesias.   Psychiatric/Behavioral: The patient is nervous/anxious.      All other systems reviewed and discussed using a comprehensive questionnaire and are negative.       Past Medical History:   Diagnosis Date   • Atrial fibrillation (HCC)     distant hx of   • Hypothyroid          Past Surgical History:   Procedure Laterality Date   • LAMINOTOMY      acdf   • TONSILLECTOMY           Current Outpatient Medications   Medication Sig Dispense Refill   • vitamin e (VITAMIN E) 400 UNIT Cap Take 400 Units by mouth every day.     • Naproxen Sodium (ALEVE) 220 MG Cap Take  by mouth 2 Times a Day.     • Calcium Citrate-Vitamin D (CALCIUM CITRATE +D PO) Take  by mouth.     • VITAMIN D, CHOLECALCIFEROL, PO Take  by mouth.     • nadolol (CORGARD) 40 MG Tab Take 1 Tab by mouth every day. Needs to be seen for further  refills. Thank you 90 Tab 0   • trazodone (DESYREL) 50 MG Tab Take 50 mg by mouth every evening. Or PRN USE     • flecainide (TAMBOCOR) 50 MG tablet Take 2 Tabs by mouth as needed. For atrial fibrillation.  May take up to 200 mg twice daily as needed. 60 Tab 11   • aspirin (ASA) 81 MG Chew Tab chewable tablet Take 1 Tab by mouth every day. 100 Tab 3   • liothyronine (CYTOMEL) 25 MCG TABS Take 12.5 mcg by mouth every day.     • valacyclovir (VALTREX) 1 GM Tab      • estradiol (ESTRACE) 0.1 MG/GM vaginal cream        No current facility-administered medications for this visit.          Allergies   Allergen Reactions   • Cardizem Unspecified     Can cause the heart to stop for 2 seconds          Family History   Problem Relation Age of Onset   • Other Brother         explained syncopal episodes.    • Heart Attack Nephew 30         Social History     Socioeconomic History   • Marital status:      Spouse name: Not on file   • Number of children: Not on file   • Years of education: Not on file   • Highest education level: Not on file   Occupational History   • Not on file   Social Needs   • Financial resource strain: Not on file   • Food insecurity:     Worry: Not on file     Inability: Not on file   • Transportation needs:     Medical: Not on file     Non-medical: Not on file   Tobacco Use   • Smoking status: Former Smoker     Packs/day: 0.75     Years: 15.00     Pack years: 11.25     Last attempt to quit: 2000     Years since quittin.0   • Smokeless tobacco: Never Used   Substance and Sexual Activity   • Alcohol use: No   • Drug use: No   • Sexual activity: Not on file   Lifestyle   • Physical activity:     Days per week: Not on file     Minutes per session: Not on file   • Stress: Not on file   Relationships   • Social connections:     Talks on phone: Not on file     Gets together: Not on file     Attends [employer] service: Not on file     Active member of club or organization: Not on file     Attends  "meetings of clubs or organizations: Not on file     Relationship status: Not on file   • Intimate partner violence:     Fear of current or ex partner: Not on file     Emotionally abused: Not on file     Physically abused: Not on file     Forced sexual activity: Not on file   Other Topics Concern   • Not on file   Social History Narrative    [employer] and works for [employer].          Physical Exam:  Ambulatory Vitals  /64 (BP Location: Left arm, Patient Position: Sitting)   Pulse (!) 46   Ht 1.689 m (5' 6.5\")   Wt 78 kg (172 lb)   SpO2 95%    Oxygen Therapy:  Pulse Oximetry: 95 %  BP Readings from Last 4 Encounters:   01/15/20 140/64   01/25/17 114/70   11/18/15 130/80   09/15/15 122/80       Weight/BMI: Body mass index is 27.35 kg/m².  Wt Readings from Last 4 Encounters:   01/15/20 78 kg (172 lb)   01/25/17 72.6 kg (160 lb)   11/03/16 72.6 kg (160 lb)   11/18/15 72.6 kg (160 lb)       General: Well appearing and in no apparent distress  Eyes: nl conjunctiva  ENT: OP clear, normal external appearance of nose and ears  Neck: JVP 4 cm H2O, no carotid bruits  Lungs: normal respiratory effort, CTAB  Heart: RRR, no murmurs, no rubs or gallops, no edema bilateral lower extremities. No LV/RV heave on cardiac palpatation. 2+ bilateral radial pulses.  2+ bilateral dp pulses.   Abdomen: soft, non tender, non distended, no masses, normal bowel sounds.  No HSM.  Extremities/MSK: no clubbing, no cyanosis  Neurological: No focal sensory deficits  Psychiatric: Appropriate affect, A/O x 3, intact judgement and insight  Skin: Warm extremities    Lab Data Review:  Lab Results   Component Value Date/Time    CHOLSTRLTOT 186 07/20/2017 12:14 PM     (H) 07/20/2017 12:14 PM    HDL 55 07/20/2017 12:14 PM    TRIGLYCERIDE 93 07/20/2017 12:14 PM       Lab Results   Component Value Date/Time    SODIUM 140 07/20/2017 12:14 PM    POTASSIUM 4.1 07/20/2017 12:14 PM    CHLORIDE 105 07/20/2017 12:14 PM    CO2 28 07/20/2017 12:14 PM   "    GLUCOSE 80 2017 12:14 PM    BUN 11 2017 12:14 PM    CREATININE 0.62 2017 12:14 PM     Lab Results   Component Value Date/Time    ALKPHOSPHAT 70 2017 12:14 PM    ASTSGOT 19 2017 12:14 PM    ALTSGPT 20 2017 12:14 PM    TBILIRUBIN 0.5 2017 12:14 PM      Lab Results   Component Value Date/Time    WBC 5.6 2017 12:14 PM     No components found for: HBGA1C  No components found for: TROPONIN  No components found for: BNP      [facility]H labs 2020:      HDL 61  tg 103  Creatinine 0.7  Sodium 139  Potassium 4.7  hgba1c 5.4  TSH 0.01  hgb 15.3      Cardiac Imaging and Procedures Review:    Echo dated :     CONCLUSIONS  Normal left ventricular chamber size. Normal left ventricular wall   thickness.  Normal regional wall motion. Normal left ventricular   systolic function. Left ventricular ejection fraction is 55% to 60%.   Diastolic function is difficult to assess with atrial fibrillation.      Radiology test Review:  CXR: 2015  HEART: Upper limits of normal size.  LUNGS: No areas of air space disease are demonstrated.  PLEURA: No effusion or pneumothorax.  There is cervical fusion hardware.      Medical Decision Makin. Paroxysmal atrial fibrillation (HCC)  Worsening and refractory to prn flec. Cannot increased baseline rate control due to resting bradycardia.   -referral to EP to discuss ablation, also discussed hospitalization to load with dofetilide.  -continue aspirin 81mg PO daily. [id number] score of 1.  -echo to ensure no worsening LV function as cause of more frequent refractory a fib.  -will discuss sleep study next visit  -continue nadolol for now.     2. Congenital hypothyroidism with diffuse goiter  F/u with endocrinologist, currently titrating medications. Request records at future visits.     3. Primary Prevention  -will discuss coronary calcium score next visit. Hypertension and dyslipidemia she believes are due to thyroid dysfunction and she  would like to have them rechecked per endocrine in 6 weeks to see if they stabilize. Will not Rx new medications today for this.       Return in about 3 months (around 4/15/2020).      Tavo Hays MD, [facility], [address]  [address] [address]  Phone: [phone]  Fax: [phone]

## 2020-01-15 NOTE — PATIENT INSTRUCTIONS
Please give me a call or email if you do not hear about your EP appointment within 2 weeks.     Please schedule an ultrasound of your heart (echocardiogram).

## 2020-01-29 ENCOUNTER — OFFICE VISIT (OUTPATIENT)
Dept: CARDIOLOGY | Facility: MEDICAL CENTER | Age: 65
End: 2020-01-29
Payer: COMMERCIAL

## 2020-01-29 ENCOUNTER — HOSPITAL ENCOUNTER (OUTPATIENT)
Dept: CARDIOLOGY | Facility: MEDICAL CENTER | Age: 65
End: 2020-01-29
Attending: INTERNAL MEDICINE
Payer: COMMERCIAL

## 2020-01-29 VITALS
HEIGHT: 67 IN | WEIGHT: 172 LBS | SYSTOLIC BLOOD PRESSURE: 122 MMHG | BODY MASS INDEX: 27 KG/M2 | DIASTOLIC BLOOD PRESSURE: 60 MMHG | OXYGEN SATURATION: 95 % | HEART RATE: 56 BPM

## 2020-01-29 DIAGNOSIS — I48.0 PAROXYSMAL ATRIAL FIBRILLATION (HCC): ICD-10-CM

## 2020-01-29 DIAGNOSIS — I49.5 SICK SINUS SYNDROME (HCC): ICD-10-CM

## 2020-01-29 DIAGNOSIS — I48.0 PAF (PAROXYSMAL ATRIAL FIBRILLATION) (HCC): Primary | ICD-10-CM

## 2020-01-29 LAB
LV EJECT FRACT  99904: 55
LV EJECT FRACT MOD 2C 99903: 61.36
LV EJECT FRACT MOD 4C 99902: 49.72
LV EJECT FRACT MOD BP 99901: 55.93

## 2020-01-29 PROCEDURE — 93306 TTE W/DOPPLER COMPLETE: CPT

## 2020-01-29 PROCEDURE — 93000 ELECTROCARDIOGRAM COMPLETE: CPT | Performed by: INTERNAL MEDICINE

## 2020-01-29 PROCEDURE — 99203 OFFICE O/P NEW LOW 30 MIN: CPT | Performed by: INTERNAL MEDICINE

## 2020-01-29 PROCEDURE — 93306 TTE W/DOPPLER COMPLETE: CPT | Mod: 26 | Performed by: INTERNAL MEDICINE

## 2020-01-30 ENCOUNTER — TELEPHONE (OUTPATIENT)
Dept: CARDIOLOGY | Facility: MEDICAL CENTER | Age: 65
End: 2020-01-30

## 2020-01-30 NOTE — PROGRESS NOTES
"Arrhythmia Clinic Note (New Patient)    DOS: 2020    Referring physician: Tavo Hays    Chief complaint/Reason for consult: PAF    HPI:  Patient is a 63 yo F. She has a history of palpitations and \"MVP\" previously followed by Dr. Elam. She was placed on nadalol which she says makes her feel better. She was subsequently diagnosed with PAF. On Flecainide PRN. She does not like taking the flecainide due to making her \"feel strange\". Her episode symptoms are palpitations, neck \"fullnes\", and chest discomfort. She says lately the flecainide is not working as well. Her episodes are lasting longer > 24 hours. She has them about once a month. Referred for consideration of AF ablation.    ROS: comprehensive review of systems completed by patient questionnaire and has been scanned into Epic (reviewed and signed by myself)    Past Medical History:   Diagnosis Date   • Atrial fibrillation (HCC)     distant hx of   • Hypothyroid        Past Surgical History:   Procedure Laterality Date   • LAMINOTOMY      acdf   • TONSILLECTOMY         Social History     Socioeconomic History   • Marital status: Single     Spouse name: Not on file   • Number of children: Not on file   • Years of education: Not on file   • Highest education level: Not on file   Occupational History   • Not on file   Social Needs   • Financial resource strain: Not on file   • Food insecurity:     Worry: Not on file     Inability: Not on file   • Transportation needs:     Medical: Not on file     Non-medical: Not on file   Tobacco Use   • Smoking status: Former Smoker     Packs/day: 0.75     Years: 15.00     Pack years: 11.25     Last attempt to quit:      Years since quittin.0   • Smokeless tobacco: Never Used   Substance and Sexual Activity   • Alcohol use: No   • Drug use: No   • Sexual activity: Not on file   Lifestyle   • Physical activity:     Days per week: Not on file     Minutes per session: Not on file   • Stress: Not on file "   Relationships   • Social connections:     Talks on phone: Not on file     Gets together: Not on file     Attends Yarsanism service: Not on file     Active member of club or organization: Not on file     Attends meetings of clubs or organizations: Not on file     Relationship status: Not on file   • Intimate partner violence:     Fear of current or ex partner: Not on file     Emotionally abused: Not on file     Physically abused: Not on file     Forced sexual activity: Not on file   Other Topics Concern   • Not on file   Social History Narrative    [employer] and works for [employer]        Family History   Problem Relation Age of Onset   • Other Brother         explained syncopal episodes.    • Heart Attack Nephew 30       Allergies   Allergen Reactions   • Cardizem Unspecified     Can cause the heart to stop for 2 seconds        Current Outpatient Medications   Medication Sig Dispense Refill   • vitamin e (VITAMIN E) 400 UNIT Cap Take 400 Units by mouth every day.     • Naproxen Sodium (ALEVE) 220 MG Cap Take  by mouth 2 Times a Day.     • Calcium Citrate-Vitamin D (CALCIUM CITRATE +D PO) Take  by mouth.     • VITAMIN D, CHOLECALCIFEROL, PO Take  by mouth.     • valacyclovir (VALTREX) 1 GM Tab      • Levothyroxine Sodium (TIROSINT) 75 MCG Cap Take 75 mcg by mouth every morning before breakfast.     • Levothyroxine Sodium (TIROSINT) 88 MCG Cap Take 88 mcg by mouth every morning before breakfast. 30 Cap    • nadolol (CORGARD) 40 MG Tab Take 1 Tab by mouth every day. Needs to be seen for further refills. Thank you 90 Tab 0   • trazodone (DESYREL) 50 MG Tab Take 50 mg by mouth every evening. Or PRN USE     • flecainide (TAMBOCOR) 50 MG tablet Take 2 Tabs by mouth as needed. For atrial fibrillation.  May take up to 200 mg twice daily as needed. 60 Tab 11   • aspirin (ASA) 81 MG Chew Tab chewable tablet Take 1 Tab by mouth every day. 100 Tab 3   • liothyronine (CYTOMEL) 25 MCG TABS Take 12.5 mcg by mouth every day.     •  "estradiol (ESTRACE) 0.1 MG/GM vaginal cream        No current facility-administered medications for this visit.        Physical Exam:  Vitals:    01/29/20 1531   BP: 122/60   BP Location: Left arm   Patient Position: Sitting   BP Cuff Size: Adult   Pulse: (!) 56   SpO2: 95%   Weight: 78 kg (172 lb)   Height: 1.689 m (5' 6.5\")     General appearance: NAD, conversant  Eyes: anicteric sclerae, no lid-lag; PERRLA  HENT: Atraumatic; moist mucous membranes, no ulcerations  Neck: Trachea midline; FROM, supple, no thyromegaly  Lungs: CTA, with normal respiratory effort and no intercostal retractions  CV: RRR, no MRGs, no JVD  Abdomen: Soft, non-tender; normal bowel sounds, no HSM  Extremities: No peripheral edema. No clubbing or cyanosis.  Skin: Normal temperature, turgor and texture; no rash or ulcers  Psych: Appropriate affect, alert and oriented to person, place and time    Data:  Labs reviewed    Prior echo/stress reviewed:  LVEF 55%    EKG interpreted by me:  Sinus tere    Impression/Plan:  1. PAF (paroxysmal atrial fibrillation) (HCC)  EKG   2. Sick sinus syndrome (HCC)       -Symptomatic PAF failing antiarrhythmic therapy now  -Not much room with her nadalol and slow sinus rates for antiarrhythmics which she does not want to come off of  -PVI not unreasonable for her  -Risks/benefits/alternatives discussed and all questions answered  -She wants to proceed  -Will start x 2 mo OAC and stop ASA after ablation    Veronica Blair MD    "

## 2020-01-30 NOTE — TELEPHONE ENCOUNTER
Patient scheduled for afib ablation w/MOOK on 4-13-20 at [facility] with Dr. Blair.

## 2020-02-01 LAB — EKG IMPRESSION: NORMAL

## 2020-02-05 DIAGNOSIS — I48.0 PAF (PAROXYSMAL ATRIAL FIBRILLATION) (HCC): ICD-10-CM

## 2020-02-06 ENCOUNTER — TELEPHONE (OUTPATIENT)
Dept: CARDIOLOGY | Facility: MEDICAL CENTER | Age: 65
End: 2020-02-06

## 2020-02-07 NOTE — TELEPHONE ENCOUNTER
Echo reviewed and heart function normal. No changes at this time to plan stated in previous notes. Please let her know, thanks!

## 2020-04-06 NOTE — TELEPHONE ENCOUNTER
Veronica Blair M.D.  You; Carlyn García, Med Ass't 2 minutes ago (10:39 AM)     Hard to know that, probably not significantly increased from delaying procedure a month or two.      LVM with answer. Pt encouraged to call if they have any questions or concerns.

## 2020-04-06 NOTE — TELEPHONE ENCOUNTER
Dr. Blair,    Due to the COVID19 virus, I cancelled this procedure. This patient was wondering, since she is unable to get this ablation done at this time. What are the odds of her going into persistent afib?    Thank You,  Carlyn

## 2020-04-23 ENCOUNTER — HOSPITAL ENCOUNTER (INPATIENT)
Facility: MEDICAL CENTER | Age: 65
LOS: 1 days | DRG: 310 | End: 2020-04-23
Attending: EMERGENCY MEDICINE | Admitting: HOSPITALIST
Payer: COMMERCIAL

## 2020-04-23 ENCOUNTER — APPOINTMENT (OUTPATIENT)
Dept: RADIOLOGY | Facility: MEDICAL CENTER | Age: 65
DRG: 310 | End: 2020-04-23
Attending: EMERGENCY MEDICINE
Payer: COMMERCIAL

## 2020-04-23 ENCOUNTER — APPOINTMENT (OUTPATIENT)
Dept: CARDIOLOGY | Facility: MEDICAL CENTER | Age: 65
DRG: 310 | End: 2020-04-23
Attending: INTERNAL MEDICINE
Payer: COMMERCIAL

## 2020-04-23 VITALS
RESPIRATION RATE: 17 BRPM | SYSTOLIC BLOOD PRESSURE: 102 MMHG | WEIGHT: 171.3 LBS | BODY MASS INDEX: 27.53 KG/M2 | TEMPERATURE: 97.8 F | HEIGHT: 66 IN | OXYGEN SATURATION: 97 % | HEART RATE: 70 BPM | DIASTOLIC BLOOD PRESSURE: 63 MMHG

## 2020-04-23 DIAGNOSIS — I48.0 PAF (PAROXYSMAL ATRIAL FIBRILLATION) (HCC): ICD-10-CM

## 2020-04-23 DIAGNOSIS — E03.0 CONGENITAL HYPOTHYROIDISM WITH DIFFUSE GOITER: ICD-10-CM

## 2020-04-23 DIAGNOSIS — I48.91 RAPID ATRIAL FIBRILLATION (HCC): ICD-10-CM

## 2020-04-23 DIAGNOSIS — I95.89 OTHER SPECIFIED HYPOTENSION: ICD-10-CM

## 2020-04-23 PROBLEM — I95.9 HYPOTENSION: Status: ACTIVE | Noted: 2020-04-23

## 2020-04-23 LAB
ANION GAP SERPL CALC-SCNC: 14 MMOL/L (ref 7–16)
APTT PPP: 31.7 SEC (ref 24.7–36)
BASOPHILS # BLD AUTO: 0.6 % (ref 0–1.8)
BASOPHILS # BLD: 0.05 K/UL (ref 0–0.12)
BUN SERPL-MCNC: 20 MG/DL (ref 8–22)
CALCIUM SERPL-MCNC: 9.6 MG/DL (ref 8.5–10.5)
CHLORIDE SERPL-SCNC: 102 MMOL/L (ref 96–112)
CO2 SERPL-SCNC: 22 MMOL/L (ref 20–33)
CREAT SERPL-MCNC: 0.8 MG/DL (ref 0.5–1.4)
EKG IMPRESSION: NORMAL
EOSINOPHIL # BLD AUTO: 0.1 K/UL (ref 0–0.51)
EOSINOPHIL NFR BLD: 1.2 % (ref 0–6.9)
ERYTHROCYTE [DISTWIDTH] IN BLOOD BY AUTOMATED COUNT: 37.7 FL (ref 35.9–50)
GLUCOSE SERPL-MCNC: 78 MG/DL (ref 65–99)
HCT VFR BLD AUTO: 46.6 % (ref 37–47)
HGB BLD-MCNC: 15.9 G/DL (ref 12–16)
IMM GRANULOCYTES # BLD AUTO: 0.02 K/UL (ref 0–0.11)
IMM GRANULOCYTES NFR BLD AUTO: 0.2 % (ref 0–0.9)
INR PPP: 0.95 (ref 0.87–1.13)
LYMPHOCYTES # BLD AUTO: 2.58 K/UL (ref 1–4.8)
LYMPHOCYTES NFR BLD: 30.4 % (ref 22–41)
MAGNESIUM SERPL-MCNC: 2.1 MG/DL (ref 1.5–2.5)
MCH RBC QN AUTO: 28.5 PG (ref 27–33)
MCHC RBC AUTO-ENTMCNC: 34.1 G/DL (ref 33.6–35)
MCV RBC AUTO: 83.7 FL (ref 81.4–97.8)
MONOCYTES # BLD AUTO: 0.51 K/UL (ref 0–0.85)
MONOCYTES NFR BLD AUTO: 6 % (ref 0–13.4)
NEUTROPHILS # BLD AUTO: 5.22 K/UL (ref 2–7.15)
NEUTROPHILS NFR BLD: 61.6 % (ref 44–72)
NRBC # BLD AUTO: 0 K/UL
NRBC BLD-RTO: 0 /100 WBC
PHOSPHATE SERPL-MCNC: 3.9 MG/DL (ref 2.5–4.5)
PLATELET # BLD AUTO: 304 K/UL (ref 164–446)
PMV BLD AUTO: 10.1 FL (ref 9–12.9)
POTASSIUM SERPL-SCNC: 4.6 MMOL/L (ref 3.6–5.5)
PROTHROMBIN TIME: 12.9 SEC (ref 12–14.6)
RBC # BLD AUTO: 5.57 M/UL (ref 4.2–5.4)
SODIUM SERPL-SCNC: 138 MMOL/L (ref 135–145)
T4 FREE SERPL-MCNC: 1.72 NG/DL (ref 0.53–1.43)
TSH SERPL DL<=0.005 MIU/L-ACNC: <0.005 UIU/ML (ref 0.38–5.33)
WBC # BLD AUTO: 8.5 K/UL (ref 4.8–10.8)

## 2020-04-23 PROCEDURE — 85610 PROTHROMBIN TIME: CPT

## 2020-04-23 PROCEDURE — 93005 ELECTROCARDIOGRAM TRACING: CPT

## 2020-04-23 PROCEDURE — 71045 X-RAY EXAM CHEST 1 VIEW: CPT

## 2020-04-23 PROCEDURE — 770020 HCHG ROOM/CARE - TELE (206)

## 2020-04-23 PROCEDURE — 83735 ASSAY OF MAGNESIUM: CPT

## 2020-04-23 PROCEDURE — 96375 TX/PRO/DX INJ NEW DRUG ADDON: CPT

## 2020-04-23 PROCEDURE — 99285 EMERGENCY DEPT VISIT HI MDM: CPT

## 2020-04-23 PROCEDURE — 84481 FREE ASSAY (FT-3): CPT

## 2020-04-23 PROCEDURE — 700105 HCHG RX REV CODE 258: Performed by: EMERGENCY MEDICINE

## 2020-04-23 PROCEDURE — 85730 THROMBOPLASTIN TIME PARTIAL: CPT

## 2020-04-23 PROCEDURE — A9270 NON-COVERED ITEM OR SERVICE: HCPCS | Performed by: HOSPITALIST

## 2020-04-23 PROCEDURE — 85025 COMPLETE CBC W/AUTO DIFF WBC: CPT

## 2020-04-23 PROCEDURE — 99223 1ST HOSP IP/OBS HIGH 75: CPT | Performed by: HOSPITALIST

## 2020-04-23 PROCEDURE — 93005 ELECTROCARDIOGRAM TRACING: CPT | Performed by: EMERGENCY MEDICINE

## 2020-04-23 PROCEDURE — 80048 BASIC METABOLIC PNL TOTAL CA: CPT

## 2020-04-23 PROCEDURE — 96376 TX/PRO/DX INJ SAME DRUG ADON: CPT

## 2020-04-23 PROCEDURE — 700102 HCHG RX REV CODE 250 W/ 637 OVERRIDE(OP): Performed by: HOSPITALIST

## 2020-04-23 PROCEDURE — 84439 ASSAY OF FREE THYROXINE: CPT

## 2020-04-23 PROCEDURE — 700111 HCHG RX REV CODE 636 W/ 250 OVERRIDE (IP): Performed by: EMERGENCY MEDICINE

## 2020-04-23 PROCEDURE — 84443 ASSAY THYROID STIM HORMONE: CPT

## 2020-04-23 PROCEDURE — 700111 HCHG RX REV CODE 636 W/ 250 OVERRIDE (IP): Performed by: HOSPITALIST

## 2020-04-23 PROCEDURE — 84100 ASSAY OF PHOSPHORUS: CPT

## 2020-04-23 PROCEDURE — 96374 THER/PROPH/DIAG INJ IV PUSH: CPT

## 2020-04-23 PROCEDURE — 700101 HCHG RX REV CODE 250: Performed by: EMERGENCY MEDICINE

## 2020-04-23 RX ORDER — LEVOTHYROXINE SODIUM 0.12 MG/1
125 TABLET ORAL
Qty: 30 TAB | Refills: 1 | Status: ON HOLD | OUTPATIENT
Start: 2020-04-24 | End: 2020-10-16

## 2020-04-23 RX ORDER — METOPROLOL TARTRATE 1 MG/ML
5 INJECTION, SOLUTION INTRAVENOUS ONCE
Status: COMPLETED | OUTPATIENT
Start: 2020-04-23 | End: 2020-04-23

## 2020-04-23 RX ORDER — LIOTHYRONINE SODIUM 25 UG/1
12.5 TABLET ORAL DAILY
Status: DISCONTINUED | OUTPATIENT
Start: 2020-04-24 | End: 2020-04-23 | Stop reason: HOSPADM

## 2020-04-23 RX ORDER — SODIUM CHLORIDE 9 MG/ML
1000 INJECTION, SOLUTION INTRAVENOUS ONCE
Status: COMPLETED | OUTPATIENT
Start: 2020-04-23 | End: 2020-04-23

## 2020-04-23 RX ORDER — NADOLOL 80 MG/1
80 TABLET ORAL DAILY
Qty: 30 TAB | Refills: 1 | Status: SHIPPED | OUTPATIENT
Start: 2020-04-23 | End: 2020-10-13

## 2020-04-23 RX ORDER — DIGOXIN 0.25 MG/ML
250 INJECTION INTRAMUSCULAR; INTRAVENOUS ONCE
Status: COMPLETED | OUTPATIENT
Start: 2020-04-23 | End: 2020-04-23

## 2020-04-23 RX ORDER — POLYETHYLENE GLYCOL 3350 17 G/17G
1 POWDER, FOR SOLUTION ORAL
Status: DISCONTINUED | OUTPATIENT
Start: 2020-04-23 | End: 2020-04-23 | Stop reason: HOSPADM

## 2020-04-23 RX ORDER — DIGOXIN 125 MCG
125 TABLET ORAL DAILY
Qty: 30 TAB | Refills: 1 | Status: SHIPPED
Start: 2020-04-24 | End: 2020-05-05

## 2020-04-23 RX ORDER — LEVOTHYROXINE SODIUM 0.12 MG/1
125 TABLET ORAL
Status: DISCONTINUED | OUTPATIENT
Start: 2020-04-24 | End: 2020-04-23 | Stop reason: HOSPADM

## 2020-04-23 RX ORDER — SODIUM CHLORIDE 9 MG/ML
500 INJECTION, SOLUTION INTRAVENOUS
Status: DISCONTINUED | OUTPATIENT
Start: 2020-04-23 | End: 2020-04-23 | Stop reason: HOSPADM

## 2020-04-23 RX ORDER — ACETAMINOPHEN 325 MG/1
650 TABLET ORAL EVERY 6 HOURS PRN
Status: DISCONTINUED | OUTPATIENT
Start: 2020-04-23 | End: 2020-04-23 | Stop reason: HOSPADM

## 2020-04-23 RX ORDER — DIGOXIN 125 MCG
125 TABLET ORAL DAILY
Status: DISCONTINUED | OUTPATIENT
Start: 2020-04-24 | End: 2020-04-23 | Stop reason: HOSPADM

## 2020-04-23 RX ORDER — DIGOXIN 250 MCG
250 TABLET ORAL ONCE
Status: DISCONTINUED | OUTPATIENT
Start: 2020-04-23 | End: 2020-04-23

## 2020-04-23 RX ORDER — AMOXICILLIN 250 MG
2 CAPSULE ORAL 2 TIMES DAILY
Status: DISCONTINUED | OUTPATIENT
Start: 2020-04-23 | End: 2020-04-23 | Stop reason: HOSPADM

## 2020-04-23 RX ORDER — BISACODYL 10 MG
10 SUPPOSITORY, RECTAL RECTAL
Status: DISCONTINUED | OUTPATIENT
Start: 2020-04-23 | End: 2020-04-23 | Stop reason: HOSPADM

## 2020-04-23 RX ORDER — DIGOXIN 0.25 MG/ML
250 INJECTION INTRAMUSCULAR; INTRAVENOUS ONCE
Status: DISCONTINUED | OUTPATIENT
Start: 2020-04-23 | End: 2020-04-23 | Stop reason: HOSPADM

## 2020-04-23 RX ADMIN — SENNOSIDES AND DOCUSATE SODIUM 2 TABLET: 8.6; 5 TABLET ORAL at 18:16

## 2020-04-23 RX ADMIN — METOPROLOL TARTRATE 5 MG: 1 INJECTION, SOLUTION INTRAVENOUS at 14:13

## 2020-04-23 RX ADMIN — SODIUM CHLORIDE 1000 ML: 9 INJECTION, SOLUTION INTRAVENOUS at 13:35

## 2020-04-23 RX ADMIN — DIGOXIN 250 MCG: 0.25 INJECTION INTRAMUSCULAR; INTRAVENOUS at 15:05

## 2020-04-23 RX ADMIN — DIGOXIN 250 MCG: 0.25 INJECTION INTRAMUSCULAR; INTRAVENOUS at 16:13

## 2020-04-23 RX ADMIN — RIVAROXABAN 20 MG: 20 TABLET, FILM COATED ORAL at 18:17

## 2020-04-23 ASSESSMENT — ENCOUNTER SYMPTOMS
CHILLS: 0
EYE REDNESS: 0
FOCAL WEAKNESS: 0
FLANK PAIN: 0
COUGH: 0
NECK PAIN: 0
PALPITATIONS: 1
NERVOUS/ANXIOUS: 1
SENSORY CHANGE: 0
BACK PAIN: 0
SPEECH CHANGE: 0
VOMITING: 0
FEVER: 0
STRIDOR: 0
TREMORS: 0
DIARRHEA: 0
ABDOMINAL PAIN: 0
HALLUCINATIONS: 0
WEAKNESS: 0
WHEEZING: 0
EYE DISCHARGE: 0
DIAPHORESIS: 0
DIZZINESS: 1
SHORTNESS OF BREATH: 1

## 2020-04-23 ASSESSMENT — COGNITIVE AND FUNCTIONAL STATUS - GENERAL
SUGGESTED CMS G CODE MODIFIER MOBILITY: CH
MOBILITY SCORE: 24
DAILY ACTIVITIY SCORE: 24
SUGGESTED CMS G CODE MODIFIER DAILY ACTIVITY: CH

## 2020-04-23 ASSESSMENT — PATIENT HEALTH QUESTIONNAIRE - PHQ9
1. LITTLE INTEREST OR PLEASURE IN DOING THINGS: NOT AT ALL
SUM OF ALL RESPONSES TO PHQ9 QUESTIONS 1 AND 2: 0
2. FEELING DOWN, DEPRESSED, IRRITABLE, OR HOPELESS: NOT AT ALL

## 2020-04-23 ASSESSMENT — CHA2DS2 SCORE
AGE 65 TO 74: NO
HYPERTENSION: NO
SEX: FEMALE
PRIOR STROKE OR TIA OR THROMBOEMBOLISM: NO
AGE 75 OR GREATER: NO
CHA2DS2 VASC SCORE: 1
DIABETES: NO
VASCULAR DISEASE: NO
CHF OR LEFT VENTRICULAR DYSFUNCTION: NO

## 2020-04-23 ASSESSMENT — LIFESTYLE VARIABLES
TOTAL SCORE: 0
DOES PATIENT WANT TO STOP DRINKING: NO
EVER HAD A DRINK FIRST THING IN THE MORNING TO STEADY YOUR NERVES TO GET RID OF A HANGOVER: NO
ON A TYPICAL DAY WHEN YOU DRINK ALCOHOL HOW MANY DRINKS DO YOU HAVE: 0
SUBSTANCE_ABUSE: 0
ALCOHOL_USE: NO
TOTAL SCORE: 0
HAVE YOU EVER FELT YOU SHOULD CUT DOWN ON YOUR DRINKING: NO
EVER FELT BAD OR GUILTY ABOUT YOUR DRINKING: NO
HAVE PEOPLE ANNOYED YOU BY CRITICIZING YOUR DRINKING: NO
AVERAGE NUMBER OF DAYS PER WEEK YOU HAVE A DRINK CONTAINING ALCOHOL: 0
EVER_SMOKED: YES
CONSUMPTION TOTAL: NEGATIVE
TOTAL SCORE: 0
HOW MANY TIMES IN THE PAST YEAR HAVE YOU HAD 5 OR MORE DRINKS IN A DAY: 0

## 2020-04-23 NOTE — ASSESSMENT & PLAN NOTE
Following IV Lopressor.    Provide IV fluid bolus as needed if SBP less than 90  Plan A. fib rate control with digoxin.  Monitor blood pressure

## 2020-04-23 NOTE — ED TRIAGE NOTES
Pt amb to triage. Then to EKG room.  Chief Complaint   Patient presents with   • Rapid Heart Beat     x3d, hx of afib   • Shortness of Breath   • Dizziness   • Weakness     extremities, during activity     Pt was scheduled for an ablation 4/13/20 that has been postponed.     Pt denies cough or fever or any known contact with a person that has tested positive for covid.

## 2020-04-23 NOTE — ASSESSMENT & PLAN NOTE
Persistent despite IV Lopressor given in ER.  She had hypotensive episode, corrected with IV fluids.  Plan continue with IV digoxin load.  Case discussed with  cardiology who will consider MOOK cardioversion.  I discussed benefits and risk of anticoagulation at length with patient.   With potential cardioversion will start Xarelto for stroke risk reduction.   Reduce thyroid replacement medication.

## 2020-04-23 NOTE — ASSESSMENT & PLAN NOTE
Patient with TSH suppressed.  Possible iatrogenic hyperthyroidism  Decrease levothyroxine dose    Continue with Cytomel   Recommend fu thyroid function testing in approx 4-6 week in addition to  outpatient with her endocrinologist.

## 2020-04-23 NOTE — ED PROVIDER NOTES
ED Provider Note    CHIEF COMPLAINT  Chief Complaint   Patient presents with   • Rapid Heart Beat     x3d, hx of afib   • Shortness of Breath   • Dizziness   • Weakness     extremities, during activity       HPI  Emily Peter is a 65 y.o. female who presents to the emergency department stating that she went into atrial fibrillation on 4/21/2020 at 6:30 AM when exercising.  She was on her treadmill and noticed the rhythm change on the treadmill as well.  Since that time, she is had slight shortness of breath, rapid heart rate, slight weakness with exercise, slight dizziness.  She states she used to take flecainide but it has not helped in the past and had an appointment for Cardi ablation 3 weeks ago was canceled secondary to COVID restrictions.  Denies chest pain, nausea, vomiting, fever, lightness, dizziness.  She has been taking her medications including aspirin as prescribed and she does have a history of hypothyroidism is taking her medications for this.  She denies recent alcohol, caffeine or stimulant use.  She does see Dr. Joe Hays for cardiology concerns.  REVIEW OF SYSTEMS  Positives as above. Pertinent negatives include fever, chest pain, vomiting, loss of sensation or strength in arms or legs that is focal.  All other review of systems are negative    PAST MEDICAL HISTORY  Past Medical History:   Diagnosis Date   • Atrial fibrillation (HCC)     distant hx of   • Hypothyroid        FAMILY HISTORY  Noncontributory    SOCIAL HISTORY  Social History     Socioeconomic History   • Marital status: Single     Spouse name: Not on file   • Number of children: Not on file   • Years of education: Not on file   • Highest education level: Not on file   Occupational History   • Not on file   Social Needs   • Financial resource strain: Not on file   • Food insecurity     Worry: Not on file     Inability: Not on file   • Transportation needs     Medical: Not on file     Non-medical: Not on file   Tobacco Use    • Smoking status: Former Smoker     Packs/day: 0.75     Years: 15.00     Pack years: 11.25     Last attempt to quit:      Years since quittin.3   • Smokeless tobacco: Never Used   Substance and Sexual Activity   • Alcohol use: No   • Drug use: No   • Sexual activity: Not on file   Lifestyle   • Physical activity     Days per week: Not on file     Minutes per session: Not on file   • Stress: Not on file   Relationships   • Social connections     Talks on phone: Not on file     Gets together: Not on file     Attends Mosque service: Not on file     Active member of club or organization: Not on file     Attends meetings of clubs or organizations: Not on file     Relationship status: Not on file   • Intimate partner violence     Fear of current or ex partner: Not on file     Emotionally abused: Not on file     Physically abused: Not on file     Forced sexual activity: Not on file   Other Topics Concern   • Not on file   Social History Narrative    [employer] and works for [employer].        SURGICAL HISTORY  Past Surgical History:   Procedure Laterality Date   • LAMINOTOMY      acdf   • TONSILLECTOMY         CURRENT MEDICATIONS  Home Medications     Reviewed by Raymond Menezes (Pharmacy Tech) on 20 at 1419  Med List Status: Complete   Medication Last Dose Status   aspirin (ASA) 81 MG Chew Tab chewable tablet 2020 Active   Calcium Citrate-Vitamin D (CALCIUM CITRATE +D PO) 2020 Active   EVENING PRIMROSE OIL PO 2020 Active   Levothyroxine Sodium (TIROSINT) 75 MCG Cap 2020 Active   Levothyroxine Sodium (TIROSINT) 88 MCG Cap 2020 Active   liothyronine (CYTOMEL) 25 MCG TABS 2020 Active   nadolol (CORGARD) 40 MG Tab 2020 Active   Naproxen Sodium (ALEVE) 220 MG Cap 2020 Active   Vitamin D, Cholecalciferol, 50 MCG ( UT) Cap 2020 Active   vitamin e (VITAMIN E) 400 UNIT Cap 2020 Active                ALLERGIES  Allergies   Allergen Reactions   • Cardizem  "Unspecified     Can cause the heart to stop for 2 seconds        PHYSICAL EXAM  VITAL SIGNS: /75   Pulse (!) 134   Temp 36 °C (96.8 °F) (Temporal)   Resp 17   Ht 1.676 m (5' 6\")   Wt 77.1 kg (170 lb)   SpO2 97%   BMI 27.44 kg/m²    Constitutional: Well developed, Well nourished, No acute distress, Non-toxic appearance.   Eyes: PERRLA, EOMI, Conjunctiva normal, No discharge.   Cardiovascular: Tachycardic, regular regular rhythm, no murmurs, No rubs, No gallops, and intact distal pulses.   Thorax & Lungs:  No respiratory distress, no rales, no rhonchi, No wheezing, No chest wall tenderness.   Abdomen: Bowel sounds normal, Soft, No tenderness, No guarding, No rebound, No pulsatile masses.   Skin: Warm, Dry, No erythema, No rash.   Extremities: Full range of motion, no deformity, no edema.  Neurologic: Alert & oriented x 3, No focal deficits noted, acting appropriately on exam.  Psychiatric: Slightly anxious      RADIOLOGY/PROCEDURES  Results for orders placed or performed during the hospital encounter of 04/23/20   CBC w/ Differential   Result Value Ref Range    WBC 8.5 4.8 - 10.8 K/uL    RBC 5.57 (H) 4.20 - 5.40 M/uL    Hemoglobin 15.9 12.0 - 16.0 g/dL    Hematocrit 46.6 37.0 - 47.0 %    MCV 83.7 81.4 - 97.8 fL    MCH 28.5 27.0 - 33.0 pg    MCHC 34.1 33.6 - 35.0 g/dL    RDW 37.7 35.9 - 50.0 fL    Platelet Count 304 164 - 446 K/uL    MPV 10.1 9.0 - 12.9 fL    Neutrophils-Polys 61.60 44.00 - 72.00 %    Lymphocytes 30.40 22.00 - 41.00 %    Monocytes 6.00 0.00 - 13.40 %    Eosinophils 1.20 0.00 - 6.90 %    Basophils 0.60 0.00 - 1.80 %    Immature Granulocytes 0.20 0.00 - 0.90 %    Nucleated RBC 0.00 /100 WBC    Neutrophils (Absolute) 5.22 2.00 - 7.15 K/uL    Lymphs (Absolute) 2.58 1.00 - 4.80 K/uL    Monos (Absolute) 0.51 0.00 - 0.85 K/uL    Eos (Absolute) 0.10 0.00 - 0.51 K/uL    Baso (Absolute) 0.05 0.00 - 0.12 K/uL    Immature Granulocytes (abs) 0.02 0.00 - 0.11 K/uL    NRBC (Absolute) 0.00 K/uL   Basic " Metabolic Panel (BMP)   Result Value Ref Range    Sodium 138 135 - 145 mmol/L    Potassium 4.6 3.6 - 5.5 mmol/L    Chloride 102 96 - 112 mmol/L    Co2 22 20 - 33 mmol/L    Glucose 78 65 - 99 mg/dL    Bun 20 8 - 22 mg/dL    Creatinine 0.80 0.50 - 1.40 mg/dL    Calcium 9.6 8.5 - 10.5 mg/dL    Anion Gap 14.0 7.0 - 16.0   MAGNESIUM   Result Value Ref Range    Magnesium 2.1 1.5 - 2.5 mg/dL   PHOSPHORUS   Result Value Ref Range    Phosphorus 3.9 2.5 - 4.5 mg/dL   TSH   Result Value Ref Range    TSH <0.005 (L) 0.380 - 5.330 uIU/mL   FREE THYROXINE   Result Value Ref Range    Free T-4 1.72 (H) 0.53 - 1.43 ng/dL   PT/INR   Result Value Ref Range    PT 12.9 12.0 - 14.6 sec    INR 0.95 0.87 - 1.13   PTT   Result Value Ref Range    APTT 31.7 24.7 - 36.0 sec   ESTIMATED GFR   Result Value Ref Range    GFR If African American >60 >60 mL/min/1.73 m 2    GFR If Non African American >60 >60 mL/min/1.73 m 2   EKG   Result Value Ref Range    Report       [facility] Emergency Dept.    Test Date:  2020  Pt Name:    MALENA GREENE                Department: ER  MRN:        [id number]                      Room:       [facility]  Gender:     Female                       Technician: [id number]  :        1955                   Requested By:ER TRIAGE PROTOCOL  Order #:    [id number]                    Reading MD: ASHLEY BARROW DO    Measurements  Intervals                                Axis  Rate:       128                          P:  IL:                                      QRS:        72  QRSD:       77                           T:          48  QT:         310  QTc:        453    Interpretive Statements  Atrial fibrillation  Baseline wander in lead(s) V2  Compared to ECG 2020 16:37:03  Sinus bradycardia no longer present  Electronically Signed On 2020 13:26:30 PDT by ASHLEY BARROW DO           COURSE & MEDICAL DECISION MAKING  Pertinent Labs & Imaging studies reviewed. (See chart for  details)    I verified that the patient was wearing a mask and I was wearing appropriate PPE every time I entered the room. I donned/doffed my PPE in the correct fashion in order to reduce the risk of spread of infection. The patient's mask was on the patient at all times during my encounter except for a brief view of the oropharynx. I mostly stayed more than six feet away from the patient and when I was less than six feet from the patient I spent less than two minutes performing a physical examination.    I discussed the patient with Dr. Velasquez at 1350 and she does agree with metoprolol 5 mg and see the patient slows her converts.  If she does not, the patient will be hospitalized for MOOK and cardioversion.  Reevaluation the patient this time, she is slightly tachycardic 130 bpm, she has a normal blood pressure, she has no focal neurological or vascular abnormalities.  The patient will be started on digoxin for rapid ventricular rate with atrial fibrillation.  Discussed the patient Dr. Elizabeth correa does accept this as well.  In addition I discussed the patient Dr. Velasquez who will consult on this patient.  I did discussed to the patient that she will be hospitalized for cardiology evaluation, probable MOOK cardioversion.  FINAL IMPRESSION  Atrial fibrillation with RVR           Electronically signed by: Pascual Coronado D.O., 4/23/2020 1:17 PM

## 2020-04-23 NOTE — H&P
Hospital Medicine History & Physical Note    Date of Service  4/23/2020    Primary Care Physician  Ricardo Wu M.D.    Code Status  Full Code    Chief Complaint  Chief Complaint   Patient presents with   • Rapid Heart Beat     x3d, hx of afib   • Shortness of Breath   • Dizziness   • Weakness     extremities, during activity       History of Presenting Illness  65 y.o. female history of atrial fibrillation on aspirin and was to get ablated by Dr. Rossy crockeri-10 days ago who presented 4/23/2020 with dizziness, throat and chest discomfort, weakness.  Patient was on her treadmill approximately 2  days ago when she noted her machine registering heart rate in the 200s.  She has had associated chest discomfort, dizziness, generalized weakness.  No fevers chills cough.  No leg swelling or calf pain.  Has had some sinus congestion associated with allergies and took Claritin.   In ER patient with rapid A. fib 130s to 140s.  She was given 5 mg of IV Lopressor and developed hypotension, SBP in the 90s.  With persistent rapid A. fib in the 130s to 40s patient will be admitted to telemetry floor.  Plan for IV digoxin load, close monitoring of blood pressure with IV fluid boluses as needed if recurrent hypotension.  I have discussed case with , cardiology who will consult and plan for MOOK cardioversion if persistent A. fib.    Review of Systems  Review of Systems   Constitutional: Negative for chills, diaphoresis, fever and malaise/fatigue.   HENT: Negative for congestion.    Eyes: Negative for discharge and redness.   Respiratory: Positive for shortness of breath. Negative for cough, wheezing and stridor.    Cardiovascular: Positive for palpitations. Negative for leg swelling.        Chest discomfort   Gastrointestinal: Negative for abdominal pain, diarrhea and vomiting.   Genitourinary: Negative for flank pain and hematuria.   Musculoskeletal: Negative for back pain, joint pain and neck pain.   Neurological:  Positive for dizziness. Negative for tremors, sensory change, speech change, focal weakness and weakness.   Psychiatric/Behavioral: Negative for hallucinations and substance abuse. The patient is nervous/anxious.        Past Medical History   has a past medical history of Atrial fibrillation (HCC) and Hypothyroid.    Surgical History   has a past surgical history that includes laminotomy and tonsillectomy.     Family History  family history includes Heart Attack (age of onset: 30) in her nephew; Other in her brother.     Social History   reports that she quit smoking about 22 years ago. She has a 11.25 pack-year smoking history. She has never used smokeless tobacco. She reports that she does not drink alcohol or use drugs.    Allergies  Allergies   Allergen Reactions   • Cardizem Unspecified     Can cause the heart to stop for 2 seconds        Medications  Prior to Admission Medications   Prescriptions Last Dose Informant Patient Reported? Taking?   Calcium Citrate-Vitamin D (CALCIUM CITRATE +D PO) 4/22/2020 at pm Patient Yes No   Sig: Take  by mouth.   EVENING PRIMROSE OIL PO 4/23/2020 at am Patient Yes Yes   Sig: Take 1 Cap by mouth every day.   Levothyroxine Sodium (TIROSINT) 75 MCG Cap 4/22/2020 at am Patient No No   Sig: Take 75 mcg by mouth every morning before breakfast.   Levothyroxine Sodium (TIROSINT) 88 MCG Cap 4/22/2020 at am Patient No No   Sig: Take 88 mcg by mouth every morning before breakfast.   Naproxen Sodium (ALEVE) 220 MG Cap 4/23/2020 at am Patient Yes No   Sig: Take  by mouth every day.   Vitamin D, Cholecalciferol, 50 MCG (2000 UT) Cap 4/23/2020 at am Patient Yes No   Sig: Take 2,000 Units by mouth every day.   aspirin (ASA) 81 MG Chew Tab chewable tablet 4/22/2020 at am Patient Yes No   Sig: Take 81 mg by mouth every day. MWF   liothyronine (CYTOMEL) 25 MCG TABS 4/22/2020 at am Patient Yes No   Sig: Take 12.5 mcg by mouth every day.   nadolol (CORGARD) 40 MG Tab 4/23/2020 at am Patient No No    Sig: Take 1 Tab by mouth every day. Needs to be seen for further refills. Thank you   vitamin e (VITAMIN E) 400 UNIT Cap 4/23/2020 at am Patient Yes No   Sig: Take 400 Units by mouth every day.      Facility-Administered Medications: None       Physical Exam  Temp:  [36 °C (96.8 °F)] 36 °C (96.8 °F)  Pulse:  [134-138] 134  Resp:  [17-52] 17  BP: ()/(71-97) 111/75  SpO2:  [96 %-100 %] 97 %    Physical Exam  Constitutional:       General: She is not in acute distress.     Appearance: She is not diaphoretic.      Comments: Patient anxious   HENT:      Head: Normocephalic and atraumatic.      Right Ear: External ear normal.      Left Ear: External ear normal.      Nose: Nose normal.   Eyes:      General: No scleral icterus.     Extraocular Movements: Extraocular movements intact.      Conjunctiva/sclera: Conjunctivae normal.   Neck:      Musculoskeletal: Normal range of motion and neck supple.   Cardiovascular:      Heart sounds: No murmur.      Comments: Irregular irregular, tachycardic  Pulmonary:      Breath sounds: No wheezing, rhonchi or rales.   Abdominal:      General: Abdomen is flat. Bowel sounds are normal. There is no distension.      Palpations: Abdomen is soft.   Musculoskeletal:         General: No swelling or tenderness.   Skin:     General: Skin is warm and dry.      Coloration: Skin is not jaundiced.   Neurological:      General: No focal deficit present.      Mental Status: She is alert and oriented to person, place, and time.         Laboratory:  Recent Labs     04/23/20  1320   WBC 8.5   RBC 5.57*   HEMOGLOBIN 15.9   HEMATOCRIT 46.6   MCV 83.7   MCH 28.5   MCHC 34.1   RDW 37.7   PLATELETCT 304   MPV 10.1     Recent Labs     04/23/20  1320   SODIUM 138   POTASSIUM 4.6   CHLORIDE 102   CO2 22   GLUCOSE 78   BUN 20   CREATININE 0.80   CALCIUM 9.6     Recent Labs     04/23/20  1320   GLUCOSE 78     Recent Labs     04/23/20  1320   APTT 31.7   INR 0.95     No results for input(s): NTPROBNP in the  last 72 hours.      No results for input(s): TROPONINT in the last 72 hours.    Imaging:  DX-CHEST-LIMITED (1 VIEW)   Final Result      No evidence of acute cardiopulmonary process.        EKG my interpretation revealed atrial fibrillation, rapid, heart rate 120s.  No ST-T findings of ischemia.    Assessment/Plan:      * Rapid atrial fibrillation (HCC)  Assessment & Plan  Persistent despite IV Lopressor given in ER.  She had hypotensive episode, corrected with IV fluids.  Plan continue with IV digoxin load.  Case discussed with  cardiology who will consider MOOK cardioversion.  I discussed benefits and risk of anticoagulation at length with patient.   With potential cardioversion will start Xarelto for stroke risk reduction.   Reduce thyroid replacement medication.     Hypotension  Assessment & Plan  Following IV Lopressor.    Provide IV fluid bolus as needed if SBP less than 90  Plan A. fib rate control with digoxin.  Monitor blood pressure    Congenital hypothyroidism with diffuse goiter- (present on admission)  Assessment & Plan  Patient with TSH suppressed.  Possible iatrogenic hyperthyroidism  Decrease levothyroxine dose    Continue with Cytomel   Recommend fu thyroid function testing in approx 4-6 week in addition to  outpatient with her endocrinologist.

## 2020-04-24 ENCOUNTER — TELEPHONE (OUTPATIENT)
Dept: CARDIOLOGY | Facility: MEDICAL CENTER | Age: 65
End: 2020-04-24

## 2020-04-24 LAB — T3FREE SERPL-MCNC: 3.57 PG/ML (ref 2.4–4.2)

## 2020-04-24 NOTE — PROGRESS NOTES
· 2 RN skin check complete with FEDERICO Diallo.  · Devices in place: NA  · Skin assessed under devices: NA  · Confirmed pressure ulcers found on: None  · New potential pressure ulcers noted on: None  ·  Bilateral ears red and blanching, bilateral elbows red and blanching, scratches on left leg, no other wounds identified   · The following interventions in place: Encouraged frequent repositioning, encouraged pt to take break from glasses. Will continue to monitor.

## 2020-04-24 NOTE — PROGRESS NOTES
Pt being discharged. Pt educated on discharge instructions and new prescriptions. Pt verbalized understanding.PIV removed, monitor checked in. Pt going home with Self via own vehicle.  RN will escort out, will monitor pt until off unit.

## 2020-04-24 NOTE — TELEPHONE ENCOUNTER
For SS, since we're doing elective procedures, can we schedule her again for an ablation?     Once we get SS's response. Please let her know, as you said Daxa, that the f/u with SS is specifically to help control her a fib and prevent a future ED visit prior to her ablation.     Thanks!

## 2020-04-24 NOTE — DISCHARGE INSTRUCTIONS
Discharge Instructions    Discharged to home by car with self. Discharged via wheelchair, hospital escort: Yes.  Special equipment needed: Not Applicable    Be sure to schedule a follow-up appointment with your primary care doctor or any specialists as instructed.     Discharge Plan:   Diet Plan: Discussed  Activity Level: Discussed  Confirmed Follow up Appointment: Patient to Call and Schedule Appointment  Confirmed Symptoms Management: Discussed  Medication Reconciliation Updated: Yes  Influenza Vaccine Indication: Not indicated: Previously immunized this influenza season and > 8 years of age    I understand that a diet low in cholesterol, fat, and sodium is recommended for good health. Unless I have been given specific instructions below for another diet, I accept this instruction as my diet prescription.   Other diet: NA    Special Instructions: None    · Is patient discharged on Warfarin / Coumadin?   No       Atrial Fibrillation  Introduction  Atrial fibrillation is a type of heartbeat that is irregular or fast (rapid). If you have this condition, your heart keeps quivering in a weird (chaotic) way. This condition can make it so your heart cannot pump blood normally. Having this condition gives a person more risk for stroke, heart failure, and other heart problems. There are different types of atrial fibrillation. Talk with your doctor to learn about the type that you have.  Follow these instructions at home:  · Take over-the-counter and prescription medicines only as told by your doctor.  · If your doctor prescribed a blood-thinning medicine, take it exactly as told. Taking too much of it can cause bleeding. If you do not take enough of it, you will not have the protection that you need against stroke and other problems.  · Do not use any tobacco products. These include cigarettes, chewing tobacco, and e-cigarettes. If you need help quitting, ask your doctor.  · If you have apnea (obstructive sleep apnea),  manage it as told by your doctor.  · Do not drink alcohol.  · Do not drink beverages that have caffeine. These include coffee, soda, and tea.  · Maintain a healthy weight. Do not use diet pills unless your doctor says they are safe for you. Diet pills may make heart problems worse.  · Follow diet instructions as told by your doctor.  · Exercise regularly as told by your doctor.  · Keep all follow-up visits as told by your doctor. This is important.  Contact a doctor if:  · You notice a change in the speed, rhythm, or strength of your heartbeat.  · You are taking a blood-thinning medicine and you notice more bruising.  · You get tired more easily when you move or exercise.  Get help right away if:  · You have pain in your chest or your belly (abdomen).  · You have sweating or weakness.  · You feel sick to your stomach (nauseous).  · You notice blood in your throw up (vomit), poop (stool), or pee (urine).  · You are short of breath.  · You suddenly have swollen feet and ankles.  · You feel dizzy.  · Your suddenly get weak or numb in your face, arms, or legs, especially if it happens on one side of your body.  · You have trouble talking, trouble understanding, or both.  · Your face or your eyelid droops on one side.  These symptoms may be an emergency. Do not wait to see if the symptoms will go away. Get medical help right away. Call your local emergency services (911 in the [place] Do not drive yourself to the hospital.   This information is not intended to replace advice given to you by your health care provider. Make sure you discuss any questions you have with your health care provider.  Document Released: 09/26/2009 Document Revised: 05/25/2017 Document Reviewed: 04/13/2016  © 2017 Elsevier      Depression / Suicide Risk    As you are discharged from this [facility] facility, it is important to learn how to keep safe from harming yourself.    Recognize the warning signs:  · Abrupt changes in personality,  positive or negative- including increase in energy   · Giving away possessions  · Change in eating patterns- significant weight changes-  positive or negative  · Change in sleeping patterns- unable to sleep or sleeping all the time   · Unwillingness or inability to communicate  · Depression  · Unusual sadness, discouragement and loneliness  · Talk of wanting to die  · Neglect of personal appearance   · Rebelliousness- reckless behavior  · Withdrawal from people/activities they love  · Confusion- inability to concentrate     If you or a loved one observes any of these behaviors or has concerns about self-harm, here's what you can do:  · Talk about it- your feelings and reasons for harming yourself  · Remove any means that you might use to hurt yourself (examples: pills, rope, extension cords, firearm)  · Get professional help from the community (Mental Health, Substance Abuse, psychological counseling)  · Do not be alone:Call your Safe Contact- someone whom you trust who will be there for you.  · Call your local CRISIS HOTLINE 749-5137 or 719-758-8889  · Call your local Children's Mobile Crisis Response Team [place] (639) 636-1302 or www.iPipeline  · Call the toll free National Suicide Prevention Hotlines   · National Suicide Prevention Lifeline 906-010-RFHB (3682)  · National Hope Line Network 800-SUICIDE (684-4011)    Return to ER if recurrent chest discomfort, palpitations, fast heart rate, shortness of breath.  Follow-up with cardiology as instructed.

## 2020-04-24 NOTE — TELEPHONE ENCOUNTER
"----- Message from Jade Springer, Med Ass't sent at 4/24/2020 11:46 AM PDT -----  Regarding: appt questions  Kristopher Mittal    Pt was recently discharged from the hospital, her AVS says to follow up w SS & she wants to know why she has to follow up with him & not JM. She was asking \"how we function\" b/c she says this doesn't make sense b/c her procedure was postponed. She's requesting a call back to discuss at [phone]     "

## 2020-04-24 NOTE — DISCHARGE SUMMARY
Hospital Medicine Discharge Note     Admit Date:  4/23/2020       Discharge Date:   4/23/2020    Attending Physician:  Godwin Carlisle M.D.      Diagnoses (includes active and resolved):     Principal Problem:    Rapid atrial fibrillation (HCC) POA: Unknown  Active Problems:    Congenital hypothyroidism with diffuse goiter POA: Yes    Hypotension POA: Unknown    Hyperthyroidism  Resolved Problems:  Rapid atrial fibrillation  Hypotension  Hospital Summary (Brief Narrative):         65-year-old female history of atrial fibrillation on aspirin and was to get ablated by Dr. Rossy wolf-10 days ago admitted with symptoms of chest discomfort, dizziness.  She had noted heart rate upwards of 200s while on treadmill.  She was found to be in A. fib RVR.  She was started on IV digoxin load.  Following admission her heart rate markedly improved.  A. fib with controlled into the 70s to 80s.  I discussed case at length with cardiology Dr. Velasquez and Dr. Beach.  Initially she was plan for MOOK cardioversion.  She did not want to stay for further intervention, preferring to have outpatient ablation.  Following discussion of benefits, risks of anticoagulation she was placed on  Xarelto anticoagulation in anticipation of future cardioversion.  Patient was anxious to go home.  She was assessed by Dr. Beach, cardiology who recommended she could go home.  It was recommended by cardiology to increase Nadolol to 40 mg daily and continue with digoxin.  She could follow-up outpatient with EP for cardioversion.  She had findings of hyperthyroidism, iatrogenic with TSH less than 0.05 and free T4 elevated 1.72.  Suspect contributing to her rapid A. Fib.  Her levothyroxine was decreased by 25%, recommend to continue with Cytomel for now.  Thyroid function testing was ordered in 6 weeks.  She states she would follow-up with her endocrinologist in [place] for monitoring and dose adjustments to her thyroid medication.  She had no chest  discomfort, palpitations shortness of breath or lightheadedness.  Rate was controlled in the 70s.  She was anxious to go home.  Blood pressure stable.  Patient was discharged in the evening.  Patient was reassessed I discussed findings, plan of care and follow-up at length with patient.  She was discharged home in stable condition.    Consultants:        Cardiology, Dr. Beach     Imaging/ Testing:      DX-CHEST-LIMITED (1 VIEW)   Final Result      No evidence of acute cardiopulmonary process.      CL-CARDIOVERSION    (Results Pending)   EC-MOOK W/O CONT    (Results Pending)         Procedures:          None     Discharge Medications:             Medication List      START taking these medications      Instructions   digoxin 125 MCG Tabs  Start taking on:  April 24, 2020  Commonly known as:  LANOXIN   Take 1 Tab by mouth every day at 6 PM.  Dose:  125 mcg     levothyroxine 125 MCG Tabs  Start taking on:  April 24, 2020  Commonly known as:  SYNTHROID  Replaces:  Levothyroxine Sodium 75 MCG Caps   Take 1 Tab by mouth Every morning on an empty stomach.  Dose:  125 mcg     rivaroxaban 20 MG Tabs tablet  Start taking on:  April 24, 2020  Commonly known as:  XARELTO   Take 1 Tab by mouth with dinner.  Dose:  20 mg        CHANGE how you take these medications      Instructions   nadolol 80 MG tablet  What changed:    · medication strength  · how much to take  Commonly known as:  CORGARD   Take 1 Tab by mouth every day. Needs to be seen for further refills. Thank you  Dose:  80 mg        CONTINUE taking these medications      Instructions   CALCIUM CITRATE +D PO   Take  by mouth.     EVENING PRIMROSE OIL PO   Take 1 Cap by mouth every day.  Dose:  1 Cap     liothyronine 25 MCG Tabs  Commonly known as:  CYTOMEL   Take 12.5 mcg by mouth every day.  Dose:  12.5 mcg     Vitamin D (Cholecalciferol) 50 MCG (2000 UT) Caps   Take 2,000 Units by mouth every day.  Dose:  2,000 Units     vitamin e 400 UNIT Caps  Commonly known as:   VITAMIN E   Take 400 Units by mouth every day.  Dose:  400 Units        STOP taking these medications    Aleve 220 MG Caps  Generic drug:  Naproxen Sodium     aspirin 81 MG Chew chewable tablet  Commonly known as:  ASA     Levothyroxine Sodium 75 MCG Caps  Commonly known as:  Tirosint  Replaced by:  levothyroxine 125 MCG Tabs     Levothyroxine Sodium 88 MCG Caps  Commonly known as:  Tirosint               Diet:       DIET ORDERS (From admission to next 24h)     Start     Ordered    04/23/20 3879  Diet NPO  AT MIDNIGHT     Question:  Restrict to:  Answer:  Sips with Medications    04/23/20 1644    04/23/20 1534  Diet Order Cardiac  ALL MEALS     Question:  Diet:  Answer:  Cardiac    04/23/20 1534                  Activity:   As tolerated.      Code status:   Full code    Primary Care Provider:    Ricardo Wu M.D.    Follow up appointment details :      .  Ricardo Wu M.D.  [address] [facility] [address]  [phone]    In 1 week      Veronica Blair M.D.  [address] [place] [id number] [phone]    In 1 week              Time spent on discharge day patient visit: 40 minutes    #################################################

## 2020-04-24 NOTE — TELEPHONE ENCOUNTER
To PADDY - She is on [employer] list to call back when we can reschedule. Scheduled f/u with SS. Thank you.

## 2020-04-24 NOTE — PROGRESS NOTES
Assumed care of pt, pt transferred from ED to [facility] Pt currently A/Ox4, on room air, no signs of SOB. Pt oriented to floor and room, discussed POC with pt, pt verbalizes understanding, Pt placed on tele monitor, fall and safety precautions in place. Admission assessment, vital signs, weight and 2 RN skin check complete. No further needs at this time.

## 2020-04-24 NOTE — TELEPHONE ENCOUNTER
Pt called stating she is having leg pain when she sits down like a 'bright horse' and feels better when she is standing. Pt wondering if her new meds can cause muscle cramping. Advised to drink water, and stretch. Unlikely to be cardiac or medication related.

## 2020-04-25 NOTE — DISCHARGE PLANNING
Hospital Care Management Discharge Planning       Action:   · This RN CM was updated by charge RNNahum that patient called requesting prior authorization for her Xarelto. She was discharged on 4/23/2020 and never picked up her prescription.   · This RN CM attempted to contact her pharmacy:  · [facility] in [place] ([phone]).   · Hugo communicated that the pharmacy is unavailable at this time.  · This RN CM will attempt again when a pharmacist returns.      8527 Addendum:  · This RN CM spoke to [facility] and requested prior authorization request be sent to [phone].

## 2020-05-05 ENCOUNTER — TELEMEDICINE (OUTPATIENT)
Dept: CARDIOLOGY | Facility: MEDICAL CENTER | Age: 65
End: 2020-05-05
Payer: COMMERCIAL

## 2020-05-05 VITALS — BODY MASS INDEX: 27.23 KG/M2 | HEIGHT: 67 IN

## 2020-05-05 DIAGNOSIS — I48.0 PAROXYSMAL ATRIAL FIBRILLATION (HCC): ICD-10-CM

## 2020-05-05 DIAGNOSIS — I95.89 OTHER SPECIFIED HYPOTENSION: ICD-10-CM

## 2020-05-05 PROCEDURE — 99442 PR PHYSICIAN TELEPHONE EVALUATION 11-20 MIN: CPT | Mod: CR | Performed by: INTERNAL MEDICINE

## 2020-05-05 RX ORDER — DRONEDARONE 400 MG/1
400 TABLET, FILM COATED ORAL 2 TIMES DAILY WITH MEALS
Qty: 60 TAB | Refills: 2 | Status: ON HOLD
Start: 2020-05-05 | End: 2020-10-15

## 2020-05-05 NOTE — PROGRESS NOTES
Telephone Appointment Visit   As a means of avoiding spread of COVID-19, this visit is being conducted by telephone. This telephone visit was initiated by the patient and they verbally consented.    Time at start of call: 1245    Reason for Call:  Symptom Follow-up    Patient Comments / History:   Patient is a 64 yo F. History of symptomatic PAF failing IC antiarrhythmics. Episodes longer and more frequent. Showed up to ED with >48 hr of rapid AF. Self converted later. Scheduled for ablation had to be cancelled for COVID     Labs / Images Reviewed   *AF    Assessment and Plan:     1. Paroxysmal atrial fibrillation (HCC)    2. Other specified hypotension    Other orders  - dronedarone (MULTAQ) 400 MG Tab; Take 1 Tab by mouth 2 times a day, with meals.  Dispense: 60 Tab; Refill: 2    -Will try to bridge her with scheduled Multaq, hopefully she is able to tolerate    Follow-up: f/u 6 weeks, plan to reschedule at that point    Time at end of call: 1302    Total Time Spent: 11-20 minutes    Veronica Blair M.D.

## 2020-06-01 NOTE — TELEPHONE ENCOUNTER
LM on patient's voicemail - arrival time is now 8:00am for a 10:00 case time. E-mailed Carto. Changed time with Laina in the cath lab.

## 2020-06-15 ENCOUNTER — TELEMEDICINE (OUTPATIENT)
Dept: CARDIOLOGY | Facility: MEDICAL CENTER | Age: 65
End: 2020-06-15
Payer: COMMERCIAL

## 2020-06-15 VITALS — BODY MASS INDEX: 27.23 KG/M2 | HEIGHT: 67 IN

## 2020-06-15 DIAGNOSIS — I48.0 PAROXYSMAL ATRIAL FIBRILLATION (HCC): ICD-10-CM

## 2020-06-15 PROCEDURE — 99442 PR PHYSICIAN TELEPHONE EVALUATION 11-20 MIN: CPT | Mod: CR | Performed by: INTERNAL MEDICINE

## 2020-06-15 NOTE — PROGRESS NOTES
Telephone Appointment Visit   As a means of avoiding spread of COVID-19, this visit is being conducted by telephone. This telephone visit was initiated by the patient and they verbally consented.    Time at start of call: 1328    Reason for Call:  Symptom Follow-up    HPI:    Patient is a 66 yo F. History of symptomatic PAF. Failing antiarrhythmics. Discussed AF ablation and trying Multaq in the interim. She has not tried Multaq and had another 3+ hour recurrence in the interim. Ablation scheduled for a week and half from now, but she says work may prohibit her from getting her procedure done this date.    Labs / Images Reviewed:       Assessment and Plan:     1. Paroxysmal atrial fibrillation (HCC)    Other orders  - aspirin EC (ECOTRIN) 81 MG Tablet Delayed Response; Take 81 mg by mouth every day.    -Will reschedule her AF ablation. Post ablation will try Eliquis. Will     Time at end of call: 4979  Total Time Spent: 11-20 minutes    Veronica Blair M.D.

## 2020-06-17 ENCOUNTER — TELEPHONE (OUTPATIENT)
Dept: CARDIOLOGY | Facility: MEDICAL CENTER | Age: 65
End: 2020-06-17

## 2020-06-17 NOTE — TELEPHONE ENCOUNTER
Per Dr. Blair - this patient needs to reschedule the ablation scheduled for 6-26-20. I left a message on her voicemail letting her know I have cancelled this procedure. I requested her to give me a call back once she is ready to reschedule.     Cancelled procedure.

## 2020-09-08 ENCOUNTER — TELEPHONE (OUTPATIENT)
Dept: CARDIOLOGY | Facility: MEDICAL CENTER | Age: 65
End: 2020-09-08

## 2020-09-08 NOTE — TELEPHONE ENCOUNTER
Dr. Blair,    This patient is scheduled for an afib ablation with you on 10-15-20. She is not taking any blood thinners right now. She was on Eliquis but stopped it gave her migraines. She is wondering if there is anything you can give her to help with the migraines so she can go back on the Eliquis.    Thank You,  Carlyn

## 2020-09-11 ENCOUNTER — TELEPHONE (OUTPATIENT)
Dept: CARDIOLOGY | Facility: MEDICAL CENTER | Age: 65
End: 2020-09-11

## 2020-09-11 NOTE — TELEPHONE ENCOUNTER
Patient was actually taking Xarelto when she got such bad constant headaches.  Could she try another blood thinner to see if that works for her?  Eliquis?

## 2020-09-14 ENCOUNTER — TELEPHONE (OUTPATIENT)
Dept: CARDIOLOGY | Facility: MEDICAL CENTER | Age: 65
End: 2020-09-14

## 2020-09-14 NOTE — TELEPHONE ENCOUNTER
Emily Peter (Key: [id number]) - [id number]  Eliquis 5MG tablets  Status: PA Response - Approved  Created: September 11th, 2020 [phone]  Sent: September 14th, 2020  Open  Archive    PA is approved

## 2020-10-13 ENCOUNTER — PRE-ADMISSION TESTING (OUTPATIENT)
Dept: ADMISSIONS | Facility: MEDICAL CENTER | Age: 65
End: 2020-10-13
Attending: INTERNAL MEDICINE
Payer: COMMERCIAL

## 2020-10-13 ENCOUNTER — TELEPHONE (OUTPATIENT)
Dept: CARDIOLOGY | Facility: MEDICAL CENTER | Age: 65
End: 2020-10-13

## 2020-10-13 DIAGNOSIS — Z01.812 PRE-OPERATIVE LABORATORY EXAMINATION: ICD-10-CM

## 2020-10-13 DIAGNOSIS — Z01.810 PRE-OPERATIVE CARDIOVASCULAR EXAMINATION: ICD-10-CM

## 2020-10-13 LAB
ALBUMIN SERPL BCP-MCNC: 4.3 G/DL (ref 3.2–4.9)
ALBUMIN/GLOB SERPL: 1.6 G/DL
ALP SERPL-CCNC: 76 U/L (ref 30–99)
ALT SERPL-CCNC: 22 U/L (ref 2–50)
ANION GAP SERPL CALC-SCNC: 13 MMOL/L (ref 7–16)
AST SERPL-CCNC: 18 U/L (ref 12–45)
BASOPHILS # BLD AUTO: 0.7 % (ref 0–1.8)
BASOPHILS # BLD: 0.04 K/UL (ref 0–0.12)
BILIRUB SERPL-MCNC: 0.4 MG/DL (ref 0.1–1.5)
BUN SERPL-MCNC: 13 MG/DL (ref 8–22)
CALCIUM SERPL-MCNC: 9.6 MG/DL (ref 8.5–10.5)
CHLORIDE SERPL-SCNC: 103 MMOL/L (ref 96–112)
CO2 SERPL-SCNC: 23 MMOL/L (ref 20–33)
COVID ORDER STATUS COVID19: NORMAL
CREAT SERPL-MCNC: 0.64 MG/DL (ref 0.5–1.4)
EKG IMPRESSION: NORMAL
EOSINOPHIL # BLD AUTO: 0.12 K/UL (ref 0–0.51)
EOSINOPHIL NFR BLD: 2.1 % (ref 0–6.9)
ERYTHROCYTE [DISTWIDTH] IN BLOOD BY AUTOMATED COUNT: 42.5 FL (ref 35.9–50)
GLOBULIN SER CALC-MCNC: 2.7 G/DL (ref 1.9–3.5)
GLUCOSE SERPL-MCNC: 83 MG/DL (ref 65–99)
HCT VFR BLD AUTO: 43.6 % (ref 37–47)
HGB BLD-MCNC: 14.6 G/DL (ref 12–16)
IMM GRANULOCYTES # BLD AUTO: 0.01 K/UL (ref 0–0.11)
IMM GRANULOCYTES NFR BLD AUTO: 0.2 % (ref 0–0.9)
INR PPP: 0.95 (ref 0.87–1.13)
LYMPHOCYTES # BLD AUTO: 2.46 K/UL (ref 1–4.8)
LYMPHOCYTES NFR BLD: 42.3 % (ref 22–41)
MCH RBC QN AUTO: 28.5 PG (ref 27–33)
MCHC RBC AUTO-ENTMCNC: 33.5 G/DL (ref 33.6–35)
MCV RBC AUTO: 85.2 FL (ref 81.4–97.8)
MONOCYTES # BLD AUTO: 0.44 K/UL (ref 0–0.85)
MONOCYTES NFR BLD AUTO: 7.6 % (ref 0–13.4)
NEUTROPHILS # BLD AUTO: 2.74 K/UL (ref 2–7.15)
NEUTROPHILS NFR BLD: 47.1 % (ref 44–72)
NRBC # BLD AUTO: 0 K/UL
NRBC BLD-RTO: 0 /100 WBC
PLATELET # BLD AUTO: 256 K/UL (ref 164–446)
PMV BLD AUTO: 10.7 FL (ref 9–12.9)
POTASSIUM SERPL-SCNC: 4.2 MMOL/L (ref 3.6–5.5)
PROT SERPL-MCNC: 7 G/DL (ref 6–8.2)
PROTHROMBIN TIME: 12.9 SEC (ref 12–14.6)
RBC # BLD AUTO: 5.12 M/UL (ref 4.2–5.4)
SARS-COV-2 RNA RESP QL NAA+PROBE: NOTDETECTED
SODIUM SERPL-SCNC: 139 MMOL/L (ref 135–145)
SPECIMEN SOURCE: NORMAL
WBC # BLD AUTO: 5.8 K/UL (ref 4.8–10.8)

## 2020-10-13 PROCEDURE — 36415 COLL VENOUS BLD VENIPUNCTURE: CPT

## 2020-10-13 PROCEDURE — 93010 ELECTROCARDIOGRAM REPORT: CPT | Performed by: INTERNAL MEDICINE

## 2020-10-13 PROCEDURE — U0003 INFECTIOUS AGENT DETECTION BY NUCLEIC ACID (DNA OR RNA); SEVERE ACUTE RESPIRATORY SYNDROME CORONAVIRUS 2 (SARS-COV-2) (CORONAVIRUS DISEASE [COVID-19]), AMPLIFIED PROBE TECHNIQUE, MAKING USE OF HIGH THROUGHPUT TECHNOLOGIES AS DESCRIBED BY CMS-2020-01-R: HCPCS

## 2020-10-13 PROCEDURE — 85610 PROTHROMBIN TIME: CPT

## 2020-10-13 PROCEDURE — 85025 COMPLETE CBC W/AUTO DIFF WBC: CPT

## 2020-10-13 PROCEDURE — C9803 HOPD COVID-19 SPEC COLLECT: HCPCS

## 2020-10-13 PROCEDURE — 80053 COMPREHEN METABOLIC PANEL: CPT

## 2020-10-13 PROCEDURE — 93005 ELECTROCARDIOGRAM TRACING: CPT

## 2020-10-13 RX ORDER — LEVOTHYROXINE SODIUM 13 UG/1
1 CAPSULE ORAL DAILY
COMMUNITY

## 2020-10-13 RX ORDER — LORATADINE 10 MG/1
10 TABLET ORAL DAILY
COMMUNITY

## 2020-10-13 RX ORDER — NADOLOL 40 MG/1
40 TABLET ORAL DAILY
Status: ON HOLD | COMMUNITY
End: 2020-10-16

## 2020-10-13 NOTE — TELEPHONE ENCOUNTER
Dr. Blair,    This patient is scheduled for an ablation with you on 10-15-20. Per the RN at pre admit, this patient has some scabbed over shingles under her left breast. She is done with her treatment for the shingles. Are we ok to proceed isabell the ablation on 10-15-20?    Thank You,  Carlyn

## 2020-10-13 NOTE — OR NURSING
Pt states she has healing shingles under left breast. Areas are scabbed and non weeping. She has completed course of acyclovir. Carlyn aware she will pass message to Dr Blair.

## 2020-10-14 NOTE — TELEPHONE ENCOUNTER
Veronica Blair M.D.  Carlyn García, Med Ass't; Sana Gibbons R.N.   Caller: Unspecified (Yesterday,  4:03 PM)             Yes

## 2020-10-15 ENCOUNTER — APPOINTMENT (OUTPATIENT)
Dept: CARDIOLOGY | Facility: MEDICAL CENTER | Age: 65
End: 2020-10-15
Attending: INTERNAL MEDICINE
Payer: COMMERCIAL

## 2020-10-15 ENCOUNTER — HOSPITAL ENCOUNTER (OUTPATIENT)
Facility: MEDICAL CENTER | Age: 65
End: 2020-10-16
Attending: INTERNAL MEDICINE | Admitting: INTERNAL MEDICINE
Payer: COMMERCIAL

## 2020-10-15 ENCOUNTER — ANESTHESIA (OUTPATIENT)
Dept: CARDIOLOGY | Facility: MEDICAL CENTER | Age: 65
End: 2020-10-15
Payer: COMMERCIAL

## 2020-10-15 ENCOUNTER — ANESTHESIA EVENT (OUTPATIENT)
Dept: CARDIOLOGY | Facility: MEDICAL CENTER | Age: 65
End: 2020-10-15
Payer: COMMERCIAL

## 2020-10-15 DIAGNOSIS — I48.0 PAF (PAROXYSMAL ATRIAL FIBRILLATION) (HCC): ICD-10-CM

## 2020-10-15 DIAGNOSIS — I48.0 PAROXYSMAL ATRIAL FIBRILLATION (HCC): ICD-10-CM

## 2020-10-15 LAB
ACT BLD: 268 SEC (ref 74–137)
ACT BLD: 301 SEC (ref 74–137)
EKG IMPRESSION: NORMAL

## 2020-10-15 PROCEDURE — 93312 ECHO TRANSESOPHAGEAL: CPT | Mod: 26 | Performed by: INTERNAL MEDICINE

## 2020-10-15 PROCEDURE — 700111 HCHG RX REV CODE 636 W/ 250 OVERRIDE (IP): Performed by: ANESTHESIOLOGY

## 2020-10-15 PROCEDURE — 93312 ECHO TRANSESOPHAGEAL: CPT

## 2020-10-15 PROCEDURE — G0378 HOSPITAL OBSERVATION PER HR: HCPCS

## 2020-10-15 PROCEDURE — 93623 PRGRMD STIMJ&PACG IV RX NFS: CPT | Mod: 26 | Performed by: INTERNAL MEDICINE

## 2020-10-15 PROCEDURE — A9270 NON-COVERED ITEM OR SERVICE: HCPCS | Performed by: INTERNAL MEDICINE

## 2020-10-15 PROCEDURE — 700101 HCHG RX REV CODE 250: Performed by: ANESTHESIOLOGY

## 2020-10-15 PROCEDURE — 93613 INTRACARDIAC EPHYS 3D MAPG: CPT | Performed by: INTERNAL MEDICINE

## 2020-10-15 PROCEDURE — 700111 HCHG RX REV CODE 636 W/ 250 OVERRIDE (IP)

## 2020-10-15 PROCEDURE — 93320 DOPPLER ECHO COMPLETE: CPT | Mod: 26 | Performed by: INTERNAL MEDICINE

## 2020-10-15 PROCEDURE — 700105 HCHG RX REV CODE 258: Performed by: INTERNAL MEDICINE

## 2020-10-15 PROCEDURE — 93656 COMPRE EP EVAL ABLTJ ATR FIB: CPT | Performed by: INTERNAL MEDICINE

## 2020-10-15 PROCEDURE — 93662 INTRACARDIAC ECG (ICE): CPT | Mod: 26 | Performed by: INTERNAL MEDICINE

## 2020-10-15 PROCEDURE — 93010 ELECTROCARDIOGRAM REPORT: CPT | Performed by: INTERNAL MEDICINE

## 2020-10-15 PROCEDURE — 700101 HCHG RX REV CODE 250

## 2020-10-15 PROCEDURE — 85347 COAGULATION TIME ACTIVATED: CPT

## 2020-10-15 PROCEDURE — 700102 HCHG RX REV CODE 250 W/ 637 OVERRIDE(OP): Performed by: INTERNAL MEDICINE

## 2020-10-15 PROCEDURE — 93005 ELECTROCARDIOGRAM TRACING: CPT | Performed by: INTERNAL MEDICINE

## 2020-10-15 PROCEDURE — 93613 INTRACARDIAC EPHYS 3D MAPG: CPT

## 2020-10-15 PROCEDURE — 160002 HCHG RECOVERY MINUTES (STAT)

## 2020-10-15 RX ORDER — PROTAMINE SULFATE 10 MG/ML
INJECTION, SOLUTION INTRAVENOUS PRN
Status: DISCONTINUED | OUTPATIENT
Start: 2020-10-15 | End: 2020-10-15 | Stop reason: SURG

## 2020-10-15 RX ORDER — ACETAMINOPHEN 500 MG
1000 TABLET ORAL ONCE
OUTPATIENT
Start: 2020-10-15 | End: 2020-10-15

## 2020-10-15 RX ORDER — CEFAZOLIN SODIUM 1 G/3ML
INJECTION, POWDER, FOR SOLUTION INTRAMUSCULAR; INTRAVENOUS PRN
Status: DISCONTINUED | OUTPATIENT
Start: 2020-10-15 | End: 2020-10-15 | Stop reason: SURG

## 2020-10-15 RX ORDER — NADOLOL 20 MG/1
20 TABLET ORAL DAILY
Status: DISCONTINUED | OUTPATIENT
Start: 2020-10-16 | End: 2020-10-16 | Stop reason: HOSPADM

## 2020-10-15 RX ORDER — SODIUM CHLORIDE, SODIUM LACTATE, POTASSIUM CHLORIDE, CALCIUM CHLORIDE 600; 310; 30; 20 MG/100ML; MG/100ML; MG/100ML; MG/100ML
INJECTION, SOLUTION INTRAVENOUS CONTINUOUS
Status: DISCONTINUED | OUTPATIENT
Start: 2020-10-15 | End: 2020-10-16 | Stop reason: HOSPADM

## 2020-10-15 RX ORDER — DEXAMETHASONE SODIUM PHOSPHATE 4 MG/ML
INJECTION, SOLUTION INTRA-ARTICULAR; INTRALESIONAL; INTRAMUSCULAR; INTRAVENOUS; SOFT TISSUE PRN
Status: DISCONTINUED | OUTPATIENT
Start: 2020-10-15 | End: 2020-10-15 | Stop reason: SURG

## 2020-10-15 RX ORDER — LABETALOL HYDROCHLORIDE 5 MG/ML
5 INJECTION, SOLUTION INTRAVENOUS
Status: DISCONTINUED | OUTPATIENT
Start: 2020-10-15 | End: 2020-10-15 | Stop reason: HOSPADM

## 2020-10-15 RX ORDER — HEPARIN SODIUM 200 [USP'U]/100ML
INJECTION, SOLUTION INTRAVENOUS
Status: COMPLETED
Start: 2020-10-15 | End: 2020-10-15

## 2020-10-15 RX ORDER — DIPHENHYDRAMINE HYDROCHLORIDE 50 MG/ML
12.5 INJECTION INTRAMUSCULAR; INTRAVENOUS
Status: DISCONTINUED | OUTPATIENT
Start: 2020-10-15 | End: 2020-10-15 | Stop reason: HOSPADM

## 2020-10-15 RX ORDER — ISOPROTERENOL HYDROCHLORIDE 0.2 MG/ML
INJECTION, SOLUTION INTRAVENOUS
Status: COMPLETED
Start: 2020-10-15 | End: 2020-10-15

## 2020-10-15 RX ORDER — ONDANSETRON 2 MG/ML
4 INJECTION INTRAMUSCULAR; INTRAVENOUS
Status: DISCONTINUED | OUTPATIENT
Start: 2020-10-15 | End: 2020-10-15 | Stop reason: HOSPADM

## 2020-10-15 RX ORDER — SUCRALFATE 1 G/1
1 TABLET ORAL
Status: DISCONTINUED | OUTPATIENT
Start: 2020-10-15 | End: 2020-10-16 | Stop reason: HOSPADM

## 2020-10-15 RX ORDER — METOPROLOL TARTRATE 1 MG/ML
1 INJECTION, SOLUTION INTRAVENOUS
Status: DISCONTINUED | OUTPATIENT
Start: 2020-10-15 | End: 2020-10-15 | Stop reason: HOSPADM

## 2020-10-15 RX ORDER — HEPARIN SODIUM,PORCINE 1000/ML
VIAL (ML) INJECTION
Status: COMPLETED
Start: 2020-10-15 | End: 2020-10-15

## 2020-10-15 RX ORDER — PHENYLEPHRINE HYDROCHLORIDE 10 MG/ML
INJECTION, SOLUTION INTRAMUSCULAR; INTRAVENOUS; SUBCUTANEOUS PRN
Status: DISCONTINUED | OUTPATIENT
Start: 2020-10-15 | End: 2020-10-15 | Stop reason: SURG

## 2020-10-15 RX ORDER — HALOPERIDOL 5 MG/ML
1 INJECTION INTRAMUSCULAR
Status: DISCONTINUED | OUTPATIENT
Start: 2020-10-15 | End: 2020-10-15 | Stop reason: HOSPADM

## 2020-10-15 RX ORDER — OMEPRAZOLE 20 MG/1
20 CAPSULE, DELAYED RELEASE ORAL
Status: DISCONTINUED | OUTPATIENT
Start: 2020-10-15 | End: 2020-10-16 | Stop reason: HOSPADM

## 2020-10-15 RX ORDER — ONDANSETRON 2 MG/ML
INJECTION INTRAMUSCULAR; INTRAVENOUS PRN
Status: DISCONTINUED | OUTPATIENT
Start: 2020-10-15 | End: 2020-10-15 | Stop reason: SURG

## 2020-10-15 RX ORDER — MIDAZOLAM HYDROCHLORIDE 1 MG/ML
INJECTION INTRAMUSCULAR; INTRAVENOUS
Status: COMPLETED
Start: 2020-10-15 | End: 2020-10-15

## 2020-10-15 RX ORDER — PROTAMINE SULFATE 10 MG/ML
INJECTION, SOLUTION INTRAVENOUS
Status: COMPLETED
Start: 2020-10-15 | End: 2020-10-15

## 2020-10-15 RX ORDER — LEVOTHYROXINE SODIUM 0.15 MG/1
150 TABLET ORAL
Status: DISCONTINUED | OUTPATIENT
Start: 2020-10-15 | End: 2020-10-16 | Stop reason: HOSPADM

## 2020-10-15 RX ORDER — LIOTHYRONINE SODIUM 25 UG/1
12.5 TABLET ORAL DAILY
Status: DISCONTINUED | OUTPATIENT
Start: 2020-10-15 | End: 2020-10-16 | Stop reason: HOSPADM

## 2020-10-15 RX ORDER — OXYCODONE HCL 5 MG/5 ML
10 SOLUTION, ORAL ORAL
Status: DISCONTINUED | OUTPATIENT
Start: 2020-10-15 | End: 2020-10-15 | Stop reason: HOSPADM

## 2020-10-15 RX ORDER — OXYCODONE HCL 5 MG/5 ML
5 SOLUTION, ORAL ORAL
Status: DISCONTINUED | OUTPATIENT
Start: 2020-10-15 | End: 2020-10-15 | Stop reason: HOSPADM

## 2020-10-15 RX ORDER — SODIUM CHLORIDE, SODIUM LACTATE, POTASSIUM CHLORIDE, CALCIUM CHLORIDE 600; 310; 30; 20 MG/100ML; MG/100ML; MG/100ML; MG/100ML
INJECTION, SOLUTION INTRAVENOUS CONTINUOUS
Status: ACTIVE | OUTPATIENT
Start: 2020-10-15 | End: 2020-10-15

## 2020-10-15 RX ORDER — LIDOCAINE HYDROCHLORIDE 20 MG/ML
INJECTION, SOLUTION EPIDURAL; INFILTRATION; INTRACAUDAL; PERINEURAL PRN
Status: DISCONTINUED | OUTPATIENT
Start: 2020-10-15 | End: 2020-10-15 | Stop reason: SURG

## 2020-10-15 RX ADMIN — PHENYLEPHRINE HYDROCHLORIDE 100 MCG: 10 INJECTION INTRAVENOUS at 13:25

## 2020-10-15 RX ADMIN — ROCURONIUM BROMIDE 50 MG: 10 INJECTION, SOLUTION INTRAVENOUS at 12:36

## 2020-10-15 RX ADMIN — PROTAMINE SULFATE 30 MG: 10 INJECTION, SOLUTION INTRAVENOUS at 14:36

## 2020-10-15 RX ADMIN — HEPARIN SODIUM: 1000 INJECTION, SOLUTION INTRAVENOUS; SUBCUTANEOUS at 13:00

## 2020-10-15 RX ADMIN — SUGAMMADEX 200 MG: 100 INJECTION, SOLUTION INTRAVENOUS at 14:35

## 2020-10-15 RX ADMIN — CEFAZOLIN 2 G: 330 INJECTION, POWDER, FOR SOLUTION INTRAMUSCULAR; INTRAVENOUS at 12:42

## 2020-10-15 RX ADMIN — OMEPRAZOLE 20 MG: 20 CAPSULE, DELAYED RELEASE ORAL at 20:41

## 2020-10-15 RX ADMIN — PHENYLEPHRINE HYDROCHLORIDE 100 MCG: 10 INJECTION INTRAVENOUS at 13:26

## 2020-10-15 RX ADMIN — LIDOCAINE HYDROCHLORIDE 100 MG: 20 INJECTION, SOLUTION EPIDURAL; INFILTRATION; INTRACAUDAL at 12:31

## 2020-10-15 RX ADMIN — MIDAZOLAM 1 MG: 1 INJECTION INTRAMUSCULAR; INTRAVENOUS at 12:31

## 2020-10-15 RX ADMIN — ONDANSETRON 4 MG: 2 INJECTION INTRAMUSCULAR; INTRAVENOUS at 14:40

## 2020-10-15 RX ADMIN — MIDAZOLAM 1 MG: 1 INJECTION INTRAMUSCULAR; INTRAVENOUS at 12:27

## 2020-10-15 RX ADMIN — FENTANYL CITRATE 50 MCG: 50 INJECTION, SOLUTION INTRAMUSCULAR; INTRAVENOUS at 14:40

## 2020-10-15 RX ADMIN — APIXABAN 5 MG: 5 TABLET, FILM COATED ORAL at 20:41

## 2020-10-15 RX ADMIN — EPHEDRINE SULFATE 10 MG: 50 INJECTION INTRAMUSCULAR; INTRAVENOUS; SUBCUTANEOUS at 13:30

## 2020-10-15 RX ADMIN — SUCRALFATE 1 G: 1 TABLET ORAL at 20:41

## 2020-10-15 RX ADMIN — POVIDONE IODINE 15 ML: 100 SOLUTION TOPICAL at 10:12

## 2020-10-15 RX ADMIN — PROPOFOL 150 MG: 10 INJECTION, EMULSION INTRAVENOUS at 12:35

## 2020-10-15 RX ADMIN — HEPARIN SODIUM 2000 UNITS: 200 INJECTION, SOLUTION INTRAVENOUS at 13:00

## 2020-10-15 RX ADMIN — SODIUM CHLORIDE, POTASSIUM CHLORIDE, SODIUM LACTATE AND CALCIUM CHLORIDE: 600; 310; 30; 20 INJECTION, SOLUTION INTRAVENOUS at 12:26

## 2020-10-15 RX ADMIN — ROCURONIUM BROMIDE 25 MG: 10 INJECTION, SOLUTION INTRAVENOUS at 14:06

## 2020-10-15 RX ADMIN — FENTANYL CITRATE 50 MCG: 50 INJECTION, SOLUTION INTRAMUSCULAR; INTRAVENOUS at 14:51

## 2020-10-15 RX ADMIN — SODIUM CHLORIDE, POTASSIUM CHLORIDE, SODIUM LACTATE AND CALCIUM CHLORIDE: 600; 310; 30; 20 INJECTION, SOLUTION INTRAVENOUS at 09:48

## 2020-10-15 RX ADMIN — PROTAMINE SULFATE 10 MG: 10 INJECTION, SOLUTION INTRAVENOUS at 14:32

## 2020-10-15 RX ADMIN — EPHEDRINE SULFATE 10 MG: 50 INJECTION INTRAMUSCULAR; INTRAVENOUS; SUBCUTANEOUS at 14:04

## 2020-10-15 RX ADMIN — DEXAMETHASONE SODIUM PHOSPHATE 10 MG: 4 INJECTION, SOLUTION INTRA-ARTICULAR; INTRALESIONAL; INTRAMUSCULAR; INTRAVENOUS; SOFT TISSUE at 12:42

## 2020-10-15 RX ADMIN — ISOPROTERENOL HYDROCHLORIDE 200 MCG: 0.2 INJECTION, SOLUTION INTRAMUSCULAR; INTRAVENOUS at 14:31

## 2020-10-15 RX ADMIN — Medication 74.6 MG: at 12:35

## 2020-10-15 RX ADMIN — PROTAMINE SULFATE 10 MG: 10 INJECTION, SOLUTION INTRAVENOUS at 14:34

## 2020-10-15 RX ADMIN — HEPARIN SODIUM: 1000 INJECTION, SOLUTION INTRAVENOUS; SUBCUTANEOUS at 14:30

## 2020-10-15 RX ADMIN — ROCURONIUM BROMIDE 25 MG: 10 INJECTION, SOLUTION INTRAVENOUS at 13:36

## 2020-10-15 ASSESSMENT — FIBROSIS 4 INDEX
FIB4 SCORE: .959403223600246954
FIB4 SCORE: .959403223600246954

## 2020-10-15 ASSESSMENT — CHA2DS2 SCORE
CHA2DS2 VASC SCORE: 1
HYPERTENSION: NO
AGE 65 TO 74: NO
SEX: FEMALE
AGE 75 OR GREATER: NO
DIABETES: NO
PRIOR STROKE OR TIA OR THROMBOEMBOLISM: NO
VASCULAR DISEASE: NO
CHF OR LEFT VENTRICULAR DYSFUNCTION: NO

## 2020-10-15 ASSESSMENT — COGNITIVE AND FUNCTIONAL STATUS - GENERAL
MOVING FROM LYING ON BACK TO SITTING ON SIDE OF FLAT BED: A LITTLE
MOVING TO AND FROM BED TO CHAIR: A LITTLE
DAILY ACTIVITIY SCORE: 24
MOBILITY SCORE: 22
SUGGESTED CMS G CODE MODIFIER DAILY ACTIVITY: CH
SUGGESTED CMS G CODE MODIFIER MOBILITY: CJ

## 2020-10-15 ASSESSMENT — PAIN DESCRIPTION - PAIN TYPE: TYPE: ACUTE PAIN

## 2020-10-15 ASSESSMENT — LIFESTYLE VARIABLES
TOTAL SCORE: 0
EVER FELT BAD OR GUILTY ABOUT YOUR DRINKING: NO
ON A TYPICAL DAY WHEN YOU DRINK ALCOHOL HOW MANY DRINKS DO YOU HAVE: 0
HAVE PEOPLE ANNOYED YOU BY CRITICIZING YOUR DRINKING: NO
ALCOHOL_USE: NO
AVERAGE NUMBER OF DAYS PER WEEK YOU HAVE A DRINK CONTAINING ALCOHOL: 0
HAVE YOU EVER FELT YOU SHOULD CUT DOWN ON YOUR DRINKING: NO
CONSUMPTION TOTAL: NEGATIVE
EVER HAD A DRINK FIRST THING IN THE MORNING TO STEADY YOUR NERVES TO GET RID OF A HANGOVER: NO
HOW MANY TIMES IN THE PAST YEAR HAVE YOU HAD 5 OR MORE DRINKS IN A DAY: 0

## 2020-10-15 NOTE — PROGRESS NOTES
Patient arrived to floor. Assumed care at 1615. Assessment complete, A&Ox 4  Admission record complete, Patient on monitor, Monitor room notified. Pt oriented to room, educated on call light/extension/phone system, RN and CNA extension numbers provided to pt, white board updated. Fall assessment complete, patient educated to call for assistance, pt verbalizes understanding. Pt denies pain or any additional needs at this time. Questions and concerns addressed. Call light, phone, and personal belongings within reach.

## 2020-10-15 NOTE — ANESTHESIA PROCEDURE NOTES
Airway    Date/Time: 10/15/2020 12:35 PM  Performed by: Sherine Yates M.D.  Authorized by: Sherine Yates M.D.     Location:  OR  Urgency:  Elective  Indications for Airway Management:  Anesthesia      Spontaneous Ventilation: absent    Sedation Level:  Deep  Preoxygenated: Yes    Patient Position:  Sniffing  Mask Difficulty Assessment:  0 - not attempted  Final Airway Type:  Endotracheal airway  Final Endotracheal Airway:  ETT  Cuffed: Yes    Technique Used for Successful ETT Placement:  Direct laryngoscopy    Insertion Site:  Oral  Blade Type:  Paola  Laryngoscope Blade/Videolaryngoscope Blade Size:  3  ETT Size (mm):  6.5  Measured from:  Teeth  ETT to Teeth (cm):  22  Placement Verified by: auscultation and capnometry    Cormack-Lehane Classification:  Grade I - full view of glottis  Number of Attempts at Approach:  1

## 2020-10-15 NOTE — OR NURSING
1503 Patient arrived from cath lab s/p a.fib ablation. Patient awake, denies pain, denies nausea. Right and left groin access sites dressing clean dry intact. Vss.  1540 Patient tolerating po intake.  1556 Criteria met to discharge patient out of recovery    1601 Report given to bravo CABALLERO T827 bed 1 all questions answered.  1614 Patient transported to room with all her personal belongings. Checked surgical sites with FEDERICO Negron no bleeding no hematoma. Patient not in any distress or discomfort.

## 2020-10-15 NOTE — ANESTHESIA TIME REPORT
Anesthesia Start and Stop Event Times     Date Time Event    10/15/2020 1133 Ready for Procedure     1222 Anesthesia Start     1505 Anesthesia Stop        Responsible Staff  10/15/20    Name Role Begin End    Sherine Yates M.D. Anesth 1222 1505        Preop Diagnosis (Free Text):  Pre-op Diagnosis             Preop Diagnosis (Codes):    Post op Diagnosis  Atrial fibrillation (HCC)      Premium Reason  A. 3PM - 7AM    Comments:

## 2020-10-15 NOTE — ANESTHESIA POSTPROCEDURE EVALUATION
Patient: Emily Peter    Procedure Summary     Date: 10/15/20 Room / Location: [facility] IMAGING - CATH LAB [facility]    Anesthesia Start: 1222 Anesthesia Stop: 1505    Procedure: CL-EP ABLATION ATRIAL FIBRILLATION Diagnosis:       PAF (paroxysmal atrial fibrillation) (HCC)      Unspecified atrial fibrillation      (See Associated Dx)    Scheduled Providers: Veronica Blair M.D.; Sherine Yates M.D. Responsible Provider: Sherine Yates M.D.    Anesthesia Type: general ASA Status: 2          Final Anesthesia Type: general  Last vitals  BP   Blood Pressure: 117/60    Temp   36.4 °C (97.6 °F)    Pulse   Pulse: 68   Resp   16    SpO2   95 %      Anesthesia Post Evaluation    Patient location during evaluation: PACU  Patient participation: complete - patient participated  Level of consciousness: awake and alert    Airway patency: patent  Anesthetic complications: no  Cardiovascular status: hemodynamically stable  Respiratory status: acceptable  Hydration status: euvolemic    PONV: none           Nurse Pain Score: 0 (NPRS)

## 2020-10-15 NOTE — OP REPORT
[facility]   Electrophysiology Procedure Note    Procedure(s) Performed:   1) Atrial fibrillation ablation and EP study  2) 3D mapping  3) ICE during diagnostics and intervention  4) Arrhythmia induction with IV drug infusion    Indication(s):  PAF    Physician(s): Veronica Blair M.D.     Resident/Assistant(s): None     Anesthesia: General endotracheal anesthetic.     Specimen(s) Removed: None     Estimated Blood Loss:  30cc     Complications:  None     Description of Procedure:   After informed written consent, the patient was brought to the EP lab in the fasting, non-sedated state. The patient was prepped and draped in the usual sterile fashion. Femoral venous access was obtained using the modified Seldinger technique. In the left femoral vein, 1 sheaths (7 Fr) were inserted over 0.035” guidewires. In the right femoral vein, 3 sheaths (8,8,10 Fr) were inserted over 0.035” guidewires. A deflectable decapolar catheter was advanced to the CS position. An intracardiac echo (ICE) catheter was advanced to the right atrium. ICE was used to identify the atrial septum, left atrial appendage, and pulmonary veins and zuly the anatomic structures to superimpose on our 3D electroanatomic map using CARTOSound. During the procedure, ICE was utilized to localize the ablation catheter, monitor for thrombus formation, and to exclude pericardial effusion. Intravenous heparin was administered to maintain -350 seconds. Double transseptal left heart catheterization was performed under intracardiac echo and hemodynamic guidance. A [place] needle was inserted into the 8.5 Fr sheaths ([place] Torflex) for transseptal puncture and placement of sheaths in the left atrium. The initial transseptal was exchanged for a Biosense Cloud 66 medium curved mapping catheter, and the Torflex was used for a second trans-septal access. Mapping of the pulmonary veins and left atrium was performed using CARTO3 FAM and a deflectable multipolar mapping  catheter (Biosense PentaRay). Wide antral circumferential ablation was performed using an 3.5 mm deflectable irrigated force contact catheter (Biosense ST SF) at primarily 30-40 W power starting from the L sided veins and then proceeding along to the RSPV and then finally the RIPV. Bidirectional pulmonary vein antrum isolation was verified at the end of the procedure by pacing inside the vein and confirming exit block along with absence of local PV signals on the PentaRay. Arrhythmia induction was performed with IV isuprel infusion at 2 mcg/min including burst pacing and extra-stimuli pacing from the proximal CS. No arrhythmia was induced. At the end of the procedure, heparin was reversed with protamine, the catheter and sheaths were removed, and hemostasis was achieved by manual compression. Following recovery from anesthesia, the patient was transferred to the PPU in good condition.       Total ablation time: 1461 seconds    Fluoroscopy time: 4.2 minutes     Electrophysiologic Findings:    1. Sinus  977 ms, AH 60 ms, HV 39 ms. AVBCL = 344 ms.    Impressions:    1. Paroxysmal atrial fibrillation.    2. Successful RF ablation pulmonary vein isolation procedure.       Recommendations:  1. Resume anticoagulation.  2. Start carafate BID x 2 weeks and daily PPI x 1 month.  3. Admit to monitored bedrest.

## 2020-10-15 NOTE — H&P
"EP Pre-procedure History and Physical    Date of service: 10/15/2020    Reason for visit/Chief complaint: PAF    HPI:   Patient is a 66 yo F. History of PAF. Symptomatic. Here for AF ablation.    Past Medical History:   Diagnosis Date   • Anesthesia     pt states inability to sedate paternal grandma and niece/ pt has never had an issue   • Arthritis     hands neck   • Atrial fibrillation (HCC)     distant hx of   • Cataract 10/2020    tawana non surgically repaired at this time   • Hypothyroid    • Pain      Past Surgical History:   Procedure Laterality Date   • OTHER NEUROLOGICAL SURG  2003    cervical fusion   • LAMINOTOMY      acdf   • TONSILLECTOMY       Family History   Problem Relation Age of Onset   • Other Brother         explained syncopal episodes.    • Heart Attack Nephew 30     Relationships   Social connections   • Talks on phone: Not on file   • Gets together: Not on file   • Attends Orthodox service: Not on file   • Active member of club or organization: Not on file   • Attends meetings of clubs or organizations: Not on file   • Relationship status: Not on file     Physical Exam:  Vitals:    10/13/20 1517 10/15/20 0956   BP:  149/66   Pulse:  (!) 45   Resp:  18   Temp:  36.8 °C (98.2 °F)   TempSrc:  Temporal   SpO2:  96%   Weight: 75.3 kg (166 lb 0.1 oz) 74.6 kg (164 lb 7.4 oz)   Height: 1.676 m (5' 6\") 1.676 m (5' 6\")     Gen: NAD, conversant  HEENT: PERRL, EOMI  LUNGS: CTA B, no w/r/r  CV: RRR, no m/r/g, no JVD  Abd: Soft, NT/ND, +BS  Ext: no edema, warm and well perfused    Labs reviewed    EKG interpreted by me:  Sinus tere    Impression/Recs:  1. PAF (paroxysmal atrial fibrillation) (HCC)  CL-EP ABLATION ATRIAL FIBRILLATION    CL-EP ABLATION ATRIAL FIBRILLATION    EC-MOOK W/O CONT    EC-MOOK W/O CONT     -Risks/benefits/alternatives discussed  -All questions answered  -Proceed with MOOK/PVI    Veronica Blair MD    "

## 2020-10-15 NOTE — ANESTHESIA PREPROCEDURE EVALUATION
64yo F here for A Fib ablation    Ex smoker; quit in 1998    Relevant Problems   CARDIAC   (+) Paroxysmal atrial fibrillation (HCC)   (+) Rapid atrial fibrillation (HCC)      ENDO   (+) Congenital hypothyroidism with diffuse goiter       Physical Exam    Airway   Mallampati: II  TM distance: >3 FB  Neck ROM: full       Cardiovascular - normal exam  Rhythm: regular  Rate: normal  (-) murmur     Dental - normal exam           Pulmonary - normal exam  Breath sounds clear to auscultation     Abdominal    Neurological - normal exam                 Anesthesia Plan    ASA 2       Plan - general       Airway plan will be ETT        Induction: intravenous    Postoperative Plan: Postoperative administration of opioids is intended.    Pertinent diagnostic labs and testing reviewed    Informed Consent:    Anesthetic plan and risks discussed with patient.    Use of blood products discussed with: patient whom consented to blood products.

## 2020-10-16 VITALS
SYSTOLIC BLOOD PRESSURE: 107 MMHG | HEART RATE: 62 BPM | DIASTOLIC BLOOD PRESSURE: 57 MMHG | OXYGEN SATURATION: 95 % | TEMPERATURE: 97.3 F | HEIGHT: 66 IN | RESPIRATION RATE: 16 BRPM | WEIGHT: 171.52 LBS | BODY MASS INDEX: 27.57 KG/M2

## 2020-10-16 PROBLEM — I95.9 HYPOTENSION: Status: RESOLVED | Noted: 2020-04-23 | Resolved: 2020-10-16

## 2020-10-16 PROBLEM — I48.91 RAPID ATRIAL FIBRILLATION (HCC): Status: RESOLVED | Noted: 2020-04-23 | Resolved: 2020-10-16

## 2020-10-16 LAB — EKG IMPRESSION: NORMAL

## 2020-10-16 PROCEDURE — A9270 NON-COVERED ITEM OR SERVICE: HCPCS | Performed by: INTERNAL MEDICINE

## 2020-10-16 PROCEDURE — 93010 ELECTROCARDIOGRAM REPORT: CPT | Performed by: INTERNAL MEDICINE

## 2020-10-16 PROCEDURE — 93005 ELECTROCARDIOGRAM TRACING: CPT | Performed by: INTERNAL MEDICINE

## 2020-10-16 PROCEDURE — G0378 HOSPITAL OBSERVATION PER HR: HCPCS

## 2020-10-16 PROCEDURE — 700102 HCHG RX REV CODE 250 W/ 637 OVERRIDE(OP): Performed by: INTERNAL MEDICINE

## 2020-10-16 PROCEDURE — 99217 PR OBSERVATION CARE DISCHARGE: CPT | Performed by: NURSE PRACTITIONER

## 2020-10-16 RX ORDER — SUCRALFATE 1 G/1
1 TABLET ORAL
Qty: 120 TAB | Refills: 0 | Status: SHIPPED | OUTPATIENT
Start: 2020-10-16 | End: 2021-01-07

## 2020-10-16 RX ORDER — NADOLOL 20 MG/1
20 TABLET ORAL DAILY
Qty: 30 TAB | Refills: 0
Start: 2020-10-17 | End: 2021-06-25

## 2020-10-16 RX ORDER — OMEPRAZOLE 20 MG/1
20 CAPSULE, DELAYED RELEASE ORAL
Qty: 30 CAP | Refills: 0 | Status: SHIPPED | OUTPATIENT
Start: 2020-10-16 | End: 2022-12-01

## 2020-10-16 RX ADMIN — OMEPRAZOLE 20 MG: 20 CAPSULE, DELAYED RELEASE ORAL at 08:28

## 2020-10-16 RX ADMIN — LIOTHYRONINE SODIUM 12.5 MCG: 25 TABLET ORAL at 05:45

## 2020-10-16 RX ADMIN — NADOLOL 20 MG: 20 TABLET ORAL at 08:28

## 2020-10-16 RX ADMIN — APIXABAN 5 MG: 5 TABLET, FILM COATED ORAL at 08:28

## 2020-10-16 RX ADMIN — LEVOTHYROXINE SODIUM 150 MCG: 0.15 TABLET ORAL at 05:45

## 2020-10-16 RX ADMIN — SUCRALFATE 1 G: 1 TABLET ORAL at 08:28

## 2020-10-16 ASSESSMENT — PAIN DESCRIPTION - PAIN TYPE
TYPE: ACUTE PAIN
TYPE: ACUTE PAIN

## 2020-10-16 NOTE — PROGRESS NOTES
Report received from night shift nurse. Pt assessed and poc has been discussed. Pt is in bed in the lowest position with siderails up x2, call bell and personal belongings are within reach. Whiteboard has been updated pt instructed to call for help or further questions. Pt has remained in sinus rhythm this morning and hasa planned discharge for this morning.

## 2020-10-16 NOTE — PROGRESS NOTES
Paged back from Dr. Long with cardiology team to continue bed rest for at least 1 hour and change out dressing for more reinforced pressure dressing. Night shift RN Cat updated.

## 2020-10-16 NOTE — PROGRESS NOTES
Pt is ready for discharge. Pt IV out and discharge paperwork read through and signed. Pt calling an uber and walking herself down.

## 2020-10-16 NOTE — DISCHARGE INSTRUCTIONS
[facility] Procedure, Care After  This sheet gives you information about how to care for yourself after your procedure. Your health care provider may also give you more specific instructions. If you have problems or questions, contact your health care provider.  What can I expect after the procedure?  After your procedure, it is common to have mild pain around the incision area. You may continue to have symptoms of an irregular heartbeat for 3-6 months after the procedure.  Follow these instructions at home:  Medicines  · Take over-the-counter and prescription medicines only as told by your health care provider.  · Ask your health care provider if the medicine prescribed to you:  ? Requires you to avoid driving or using heavy machinery.  ? Can cause constipation. You may need to take these actions to prevent or treat constipation:  § Drink enough fluid to keep your urine pale yellow.  § Take over-the-counter or prescription medicines.  § Eat foods that are high in fiber, such as beans, whole grains, and fresh fruits and vegetables.  § Limit foods that are high in fat and processed sugars, such as fried or sweet foods.  Incision care    · Follow instructions from your health care provider about how to take care of your incision. Make sure you leave stitches (sutures), skin glue, or adhesive strips in place. These skin closures may need to stay in place for 2 weeks or longer. If adhesive strip edges start to loosen and curl up, you may trim the loose edges. Do not remove adhesive strips completely unless your health care provider tells you to do that.  · Until the incisions heal, women should wear a soft bra or place gauze pads over the incisions when wearing a wire bra.  · Check your incision areas every day for signs of infection. Check for:  ? Redness or swelling.  ? Fluid or blood.  ? Warmth.  ? Pus or a bad smell.  Bathing  · Do not take baths, swim, or use a hot tub until your health care provider approves. Ask  your health care provider if you may take showers. You may only be allowed to take sponge baths.  · Keep the area where the chest tube was inserted dry for 48 hours.  Activity  · Rest as told by your health care provider.  · Avoid sitting for a long time without moving. Get up to take short walks every 1-2 hours. This is important to improve blood flow and breathing. Ask for help if you feel weak or unsteady.  · Avoid activities that use your chest muscles, such as lifting heavy objects, for at least 8 weeks or for as long as told by your health care provider.  · Do not lift anything that is heavier than 10 lb (4.5 kg), or the limit that you are told, until your health care provider says that it is safe.  · Return to your normal activities as told by your health care provider. Ask your health care provider what activities are safe for you.  · Do not drive for 24 hours if you were given a sedative during your procedure.  · Do not travel by airplane for at least 2 weeks after your chest tube is removed, or for as long as told by your health care provider.  General instructions    · Take deep breaths to expand the lungs and to protect against pneumonia as told by your health care provider.  · If you were given an incentive spirometer to help with breathing, use it as directed by your health care provider. This is a tool that measures how well you are filling your lungs with each breath.  · Do breathing exercises as told by your health care provider.  · Do not use any products that contain nicotine or tobacco, such as cigarettes, e-cigarettes, and chewing tobacco. If you need help quitting, ask your health care provider.  · Keep all follow-up visits as told by your health care provider. This is important.  Contact a health care provider if:  · Your heart frequently feels like it is fluttering or beating rapidly.  · You have redness or swelling around your incision.  · You have fluid or blood coming from your  incision.  · Your incision feels warm to the touch.  · You have pus or a bad smell coming from your incision.  · You have a fever or chills.  Get help right away if you:  · Have a rash.  · Have shortness of breath.  · Have difficulty breathing.  · Have chest pain that does not come from one of your incisions.  · Feel weak, light-headed, dizzy, or faint.  These symptoms may represent a serious problem that is an emergency. Do not wait to see if the symptoms will go away. Get medical help right away. Call your local emergency services (911 in the [place] Do not drive yourself to the hospital.  Summary  · After your procedure, it is common to feel mild pain around the incision area.  · Take over-the-counter and prescription medicines only as told by your health care provider.  · Rest as told by your health care provider. Get up to walk from time to time. Follow all activity restrictions as told. Ask your health care provider what activities are safe for you.  · Do breathing exercises as told by your health care provider.  · Keep all follow-up visits as told by your health care provider. This is important.  This information is not intended to replace advice given to you by your health care provider. Make sure you discuss any questions you have with your health care provider.  Document Released: 09/11/2013 Document Revised: 02/07/2020 Document Reviewed: 02/07/2020  [employer] Patient Education © 2020 [employer]  Discharge Instructions    Discharged to home by taxi with escort. Discharged via walking, hospital escort: Yes.  Special equipment needed: Not Applicable    Be sure to schedule a follow-up appointment with your primary care doctor or any specialists as instructed.     Discharge Plan:   Diet Plan: Discussed  Activity Level: Discussed  Confirmed Follow up Appointment: Appointment Scheduled  Confirmed Symptoms Management: Discussed  Medication Reconciliation Updated: Yes  Influenza Vaccine Indication: Patient  Refuses    I understand that a diet low in cholesterol, fat, and sodium is recommended for good health. Unless I have been given specific instructions below for another diet, I accept this instruction as my diet prescription.   Other diet: cardiac    Special Instructions: NA    · Is patient discharged on Warfarin / Coumadin?   No     Depression / Suicide Risk    As you are discharged from this [facility] facility, it is important to learn how to keep safe from harming yourself.    Recognize the warning signs:  · Abrupt changes in personality, positive or negative- including increase in energy   · Giving away possessions  · Change in eating patterns- significant weight changes-  positive or negative  · Change in sleeping patterns- unable to sleep or sleeping all the time   · Unwillingness or inability to communicate  · Depression  · Unusual sadness, discouragement and loneliness  · Talk of wanting to die  · Neglect of personal appearance   · Rebelliousness- reckless behavior  · Withdrawal from people/activities they love  · Confusion- inability to concentrate     If you or a loved one observes any of these behaviors or has concerns about self-harm, here's what you can do:  · Talk about it- your feelings and reasons for harming yourself  · Remove any means that you might use to hurt yourself (examples: pills, rope, extension cords, firearm)  · Get professional help from the community (Mental Health, Substance Abuse, psychological counseling)  · Do not be alone:Call your Safe Contact- someone whom you trust who will be there for you.  · Call your local CRISIS HOTLINE 856-4580 or 574-969-0024  · Call your local Children's Mobile Crisis Response Team [place] (956) 059-3377 or www.Lovejuice  · Call the toll free National Suicide Prevention Hotlines   · National Suicide Prevention Lifeline 040-432-HWVR (3439)  · National Hope Line Network 800-SUICIDE (394-8120)    Apixaban oral tablets  What is this  medicine?  APIXABAN (a PIX a ban) is an anticoagulant (blood thinner). It is used to lower the chance of stroke in people with a medical condition called atrial fibrillation. It is also used to treat or prevent blood clots in the lungs or in the veins.  This medicine may be used for other purposes; ask your health care provider or pharmacist if you have questions.  COMMON BRAND NAME(S): Eliquis  What should I tell my health care provider before I take this medicine?  They need to know if you have any of these conditions:  · antiphospholipid antibody syndrome  · bleeding disorders  · bleeding in the brain  · blood in your stools (black or tarry stools) or if you have blood in your vomit  · history of blood clots  · history of stomach bleeding  · kidney disease  · liver disease  · mechanical heart valve  · an unusual or allergic reaction to apixaban, other medicines, foods, dyes, or preservatives  · pregnant or trying to get pregnant  · breast-feeding  How should I use this medicine?  Take this medicine by mouth with a glass of water. Follow the directions on the prescription label. You can take it with or without food. If it upsets your stomach, take it with food. Take your medicine at regular intervals. Do not take it more often than directed. Do not stop taking except on your doctor's advice. Stopping this medicine may increase your risk of a blood clot. Be sure to refill your prescription before you run out of medicine.  Talk to your pediatrician regarding the use of this medicine in children. Special care may be needed.  Overdosage: If you think you have taken too much of this medicine contact a poison control center or emergency room at once.  NOTE: This medicine is only for you. Do not share this medicine with others.  What if I miss a dose?  If you miss a dose, take it as soon as you can. If it is almost time for your next dose, take only that dose. Do not take double or extra doses.  What may interact with  this medicine?  This medicine may interact with the following:  · aspirin and aspirin-like medicines  · certain medicines for fungal infections like ketoconazole and itraconazole  · certain medicines for seizures like carbamazepine and phenytoin  · certain medicines that treat or prevent blood clots like warfarin, enoxaparin, and dalteparin  · clarithromycin  · NSAIDs, medicines for pain and inflammation, like ibuprofen or naproxen  · rifampin  · ritonavir  · Louise's wort  This list may not describe all possible interactions. Give your health care provider a list of all the medicines, herbs, non-prescription drugs, or dietary supplements you use. Also tell them if you smoke, drink alcohol, or use illegal drugs. Some items may interact with your medicine.  What should I watch for while using this medicine?  Visit your healthcare professional for regular checks on your progress. You may need blood work done while you are taking this medicine. Your condition will be monitored carefully while you are receiving this medicine. It is important not to miss any appointments.  Avoid sports and activities that might cause injury while you are using this medicine. Severe falls or injuries can cause unseen bleeding. Be careful when using sharp tools or knives. Consider using an electric razor. Take special care brushing or flossing your teeth. Report any injuries, bruising, or red spots on the skin to your healthcare professional.  If you are going to need surgery or other procedure, tell your healthcare professional that you are taking this medicine.  Wear a medical ID bracelet or chain. Carry a card that describes your disease and details of your medicine and dosage times.  What side effects may I notice from receiving this medicine?  Side effects that you should report to your doctor or health care professional as soon as possible:  · allergic reactions like skin rash, itching or hives, swelling of the face, lips, or  tongue  · signs and symptoms of bleeding such as bloody or black, tarry stools; red or dark-brown urine; spitting up blood or brown material that looks like coffee grounds; red spots on the skin; unusual bruising or bleeding from the eye, gums, or nose  · signs and symptoms of a blood clot such as chest pain; shortness of breath; pain, swelling, or warmth in the leg  · signs and symptoms of a stroke such as changes in vision; confusion; trouble speaking or understanding; severe headaches; sudden numbness or weakness of the face, arm or leg; trouble walking; dizziness; loss of coordination  This list may not describe all possible side effects. Call your doctor for medical advice about side effects. You may report side effects to FDA at 6-828-YWS-6798.  Where should I keep my medicine?  Keep out of the reach of children.  Store at room temperature between 20 and 25 degrees C (68 and 77 degrees F). Throw away any unused medicine after the expiration date.  NOTE: This sheet is a summary. It may not cover all possible information. If you have questions about this medicine, talk to your doctor, pharmacist, or health care provider.  © 2020 [employer] Standard (2019-08-28 17:39:34)

## 2020-10-16 NOTE — DISCHARGE SUMMARY
Physician Discharge Summary     Patient ID:  Emily Peter  [id number]  65 y.o.  1955    Admit date: 10/15/2020    Discharge date and time: No discharge date for patient encounter.     Admitting Physician: Veronica Blair M.D.     Discharge Physician: Veronica Blair MD    Admission Diagnoses: Unspecified atrial fibrillation [I48.91]    Discharge Diagnoses: Successful RF ablation for atrial fibriallation    Admission Condition: good    Discharged Condition: good    Indication for Admission: Observation     Hospital Course: Patient admitted 10/15/2020 for elective ablation for atrial fibrillation due to patient's history of symptomatic proximal atrial fibrillation.  The procedure was performed by Dr. Blair with anesthesia.  The procedure was successful and patient was admitted for observation overnight observation.  This morning, patient is doing well. Telemetry monitoring overnight demonstrated sinus rhythm.  Patient ambulating without difficulties.  Education provided regarding home medications with addition of Carafate, and Protonix.  Patient was taking Eliquis at home, but reports migraines.  Patient was prescribed Xarelto prior to admission.  Patient will initiate Xarelto at home.  Patient to follow-up in 1 month.     Consults: none    Significant Diagnostic Studies:   EP Ablation for Atrial Fibrillation (10/16/2020):   Electrophysiologic Findings:    1. Sinus  977 ms, AH 60 ms, HV 39 ms. AVBCL = 344 ms.  Impressions:    1. Paroxysmal atrial fibrillation.    2. Successful RF ablation pulmonary vein isolation procedure.      Treatments: Successful RF ablation pulmonary vein isolation procedure.      Discharge Exam:  Gen: Vital signs reviewed.  Patient alert, NAD, conversant  HEENT: PERRLA, EOMI  Lungs: Breath sounds diminished at bilateral lower lobes.  No wheezing, rhonchi, or rales.  CV: RRR, heart sounds normal, no MRG, no JVD  SKIN: Warm, Bilateral femoral groin sites.  No swelling, mild bruising, no redness.  Dressing in place, clean dry and intact.   Ext: No edema, peripheral pulses 2+.    Labs reviewed  EKG (10/16/2020): Sinus Rhythm      Disposition: home    Patient Instructions:      Medication List      START taking these medications      Instructions   omeprazole 20 MG delayed-release capsule  Commonly known as: PRILOSEC   Take 1 Cap by mouth every morning before breakfast.  Dose: 20 mg     rivaroxaban 20 MG Tabs tablet  Commonly known as: Xarelto   Take 1 Tab by mouth with dinner.  Dose: 20 mg     sucralfate 1 GM Tabs  Commonly known as: CARAFATE   Take 1 Tab by mouth 4 Times a Day,Before Meals and at Bedtime.  Dose: 1 g        CHANGE how you take these medications      Instructions   nadolol 20 MG Tabs  Start taking on: October 17, 2020  What changed:   · medication strength  · how much to take  Commonly known as: CORGARD   Take 1 Tab by mouth every day.  Dose: 20 mg     Tirosint 150 MCG Caps  What changed: Another medication with the same name was removed. Continue taking this medication, and follow the directions you see here.  Generic drug: Levothyroxine Sodium   Take 1 Tab by mouth every day.  Dose: 1 Tab        CONTINUE taking these medications      Instructions   aspirin EC 81 MG Tbec  Commonly known as: ECOTRIN   Take 81 mg by mouth every day.  Dose: 81 mg     CALCIUM CITRATE +D PO   Take  by mouth.     EVENING PRIMROSE OIL PO   Take 1 Cap by mouth every day.  Dose: 1 Cap     liothyronine 25 MCG Tabs  Commonly known as: CYTOMEL   Take 12.5 mcg by mouth every day.  Dose: 12.5 mcg     loratadine 10 MG Tabs  Commonly known as: CLARITIN   Take 10 mg by mouth every day.  Dose: 10 mg     VALACYCLOVIR HCL PO   Take  by mouth every day.     Vitamin D (Cholecalciferol) 50 MCG (2000 UT) Caps   Take 2,000 Units by mouth every day.  Dose: 2,000 Units     vitamin e 400 UNIT Caps  Commonly known as: VITAMIN E   Take 400 Units by mouth every day.  Dose: 400 Units        STOP taking these medications    apixaban 5mg  Tabs  Commonly known as: ELIQUIS          Activity: activity as tolerated and no heavy lifting for 5-7 days  Diet: regular diet  Wound Care: Keep the area clean and dry when you are not showering. Do not use creams, lotions or ointment on the wound site. Wear loose clothes and loose underwear. Do not take a bath, tub soak, go in a Jacuzzi, or swim in a pool or lake for one week after the procedure. Monitor groin site for increased swelling, bruising, or redness. Notify office if these symptoms develop.     Follow-up with MULU Rodríguez in 1 month.  Future Appointments   Date Time Provider Department Center   11/19/2020  8:20 AM PERRY Rodríguez [facility] None       Signed:  PERRY Shaffer  10/16/2020  8:50 AM

## 2020-10-16 NOTE — PROGRESS NOTES
Physician Discharge Summary     Patient ID:  Emily Peter  [id number]  65 y.o.  1955    Admit date: 10/15/2020    Discharge date and time: No discharge date for patient encounter.     Admitting Physician: Veronica Blair M.D.     Discharge Physician: Veronica Blair MD    Admission Diagnoses: Unspecified atrial fibrillation [I48.91]    Discharge Diagnoses: Successful RF ablation for atrial fibriallation    Admission Condition: good    Discharged Condition: good    Indication for Admission: Observation     Hospital Course: Patient admitted 10/15/2020 for elective ablation for atrial fibrillation due to patient's history of symptomatic proximal atrial fibrillation.  The procedure was performed by Dr. Blair with anesthesia.  The procedure was successful and patient was admitted for observation overnight observation.  This morning, patient is doing well. Telemetry monitoring overnight demonstrated sinus rhythm.  Patient ambulating without difficulties.  Education provided regarding home medications with addition of Carafate, and Protonix.  Patient was taking Eliquis at home, but reports migraines.  Patient was prescribed Xarelto prior to admission.  Patient will initiate Xarelto at home.  Patient to follow-up in 1 month.     Consults: none    Significant Diagnostic Studies:   EP Ablation for Atrial Fibrillation (10/16/2020):   Electrophysiologic Findings:    1. Sinus  977 ms, AH 60 ms, HV 39 ms. AVBCL = 344 ms.  Impressions:    1. Paroxysmal atrial fibrillation.    2. Successful RF ablation pulmonary vein isolation procedure.      Treatments: Successful RF ablation pulmonary vein isolation procedure.      Discharge Exam:  Gen: Vital signs reviewed.  Patient alert, NAD, conversant  HEENT: PERRLA, EOMI  Lungs: Breath sounds diminished at bilateral lower lobes.  No wheezing, rhonchi, or rales.  CV: RRR, heart sounds normal, no MRG, no JVD  SKIN: Warm, Bilateral femoral groin sites.  No swelling, mild bruising, no redness.  Dressing in place, clean dry and intact.   Ext: No edema, peripheral pulses 2+.    Labs reviewed  EKG (10/16/2020): Sinus Rhythm      Disposition: home    Patient Instructions:      Medication List      START taking these medications      Instructions   omeprazole 20 MG delayed-release capsule  Commonly known as: PRILOSEC   Take 1 Cap by mouth every morning before breakfast.  Dose: 20 mg     rivaroxaban 20 MG Tabs tablet  Commonly known as: Xarelto   Take 1 Tab by mouth with dinner.  Dose: 20 mg     sucralfate 1 GM Tabs  Commonly known as: CARAFATE   Take 1 Tab by mouth 4 Times a Day,Before Meals and at Bedtime.  Dose: 1 g        CHANGE how you take these medications      Instructions   nadolol 20 MG Tabs  Start taking on: October 17, 2020  What changed:   · medication strength  · how much to take  Commonly known as: CORGARD   Take 1 Tab by mouth every day.  Dose: 20 mg     Tirosint 150 MCG Caps  What changed: Another medication with the same name was removed. Continue taking this medication, and follow the directions you see here.  Generic drug: Levothyroxine Sodium   Take 1 Tab by mouth every day.  Dose: 1 Tab        CONTINUE taking these medications      Instructions   aspirin EC 81 MG Tbec  Commonly known as: ECOTRIN   Take 81 mg by mouth every day.  Dose: 81 mg     CALCIUM CITRATE +D PO   Take  by mouth.     EVENING PRIMROSE OIL PO   Take 1 Cap by mouth every day.  Dose: 1 Cap     liothyronine 25 MCG Tabs  Commonly known as: CYTOMEL   Take 12.5 mcg by mouth every day.  Dose: 12.5 mcg     loratadine 10 MG Tabs  Commonly known as: CLARITIN   Take 10 mg by mouth every day.  Dose: 10 mg     VALACYCLOVIR HCL PO   Take  by mouth every day.     Vitamin D (Cholecalciferol) 50 MCG (2000 UT) Caps   Take 2,000 Units by mouth every day.  Dose: 2,000 Units     vitamin e 400 UNIT Caps  Commonly known as: VITAMIN E   Take 400 Units by mouth every day.  Dose: 400 Units        STOP taking these medications    apixaban 5mg  Tabs  Commonly known as: ELIQUIS          Activity: activity as tolerated and no heavy lifting for 5-7 days  Diet: regular diet  Wound Care: Keep the area clean and dry when you are not showering. Do not use creams, lotions or ointment on the wound site. Wear loose clothes and loose underwear. Do not take a bath, tub soak, go in a Jacuzzi, or swim in a pool or lake for one week after the procedure. Monitor groin site for increased swelling, bruising, or redness. Notify office if these symptoms develop.     Follow-up with MULU Rodríguez in 1 month.    Signed:  PERRY Shaffer  10/16/2020  8:50 AM

## 2020-10-16 NOTE — PROGRESS NOTES
At 1830 on groin check, right venous access site noted to be saturated. Pressure applied for 10 minutes and then sand bag applied. Paged Dr. Blair to update and obtain further orders regarding length of bedrest.

## 2020-10-16 NOTE — PROGRESS NOTES
Received report from FEDERICO Negron. Reviewed POC, no questions at this time. Call light is within reach, bed is in lowest/locked position.

## 2020-10-26 ENCOUNTER — TELEPHONE (OUTPATIENT)
Dept: CARDIOLOGY | Facility: MEDICAL CENTER | Age: 65
End: 2020-10-26

## 2020-10-26 NOTE — TELEPHONE ENCOUNTER
Pt called with pea size lump on R groin site. Denies swelling, heat, drainage, pain. Pt feeling overall good just wanted notify us of little lump. Advised not abnormal, and to call if anything changes.

## 2020-11-20 ENCOUNTER — TELEPHONE (OUTPATIENT)
Dept: CARDIOLOGY | Facility: MEDICAL CENTER | Age: 65
End: 2020-11-20

## 2020-11-20 NOTE — TELEPHONE ENCOUNTER
----- Message from Daxa Carreon R.N. sent at 11/20/2020  1:25 PM PST -----  Regarding: FW: refill    ----- Message -----  From: Deivka Barrow, Med Ass't  Sent: 11/20/2020   1:12 PM PST  To: Daxa Carreon R.N.  Subject: FW: refill                                       Patient is requesting Eliquis and the chart shows Xarelto. I see that I did PA for Eliquis.    Thanks ,  Devika   ----- Message -----  From: Devika Salguero, Med Ass't  Sent: 11/20/2020  11:21 AM PST  To: Rufino Silva/Mitchell  Subject: refill                                           SS    Pt calling in regards to a refill of her apixaban (ELIQUIS) tablet 5 mg, Pt states that when she went to refill that the pharmacist told her dr SS rejected the Rx and isnt sure why. Pt states that her insurance requires a 90day Rx, not a 30. Pt states she is out of this Rx. Please call pt back at [phone].    Thank you.

## 2020-11-20 NOTE — TELEPHONE ENCOUNTER
Spoke with patient about Eliquis and Xarelto. Patient stated that she has been taking the Eliquis that she never received an rx when she was discharged from the hospital for Xarelto. Patient confirmed that she is taking Eliquis 5mg BID    To  nurse

## 2020-11-30 ENCOUNTER — TELEPHONE (OUTPATIENT)
Dept: CARDIOLOGY | Facility: MEDICAL CENTER | Age: 65
End: 2020-11-30

## 2020-11-30 NOTE — TELEPHONE ENCOUNTER
SS    TO: 2:30p/Mon/Office  NM: Emily Peter   PH: [phone]   PT NM: Emily Peter   : 57   REG DR: Dr Balir   RE: Had an ablation mid October. Now  she needs to have oral surgery and her  Dentist needs clearance for the  surgery. Please call back.

## 2020-12-01 NOTE — TELEPHONE ENCOUNTER
To SS - please advise  Pt states she has some dental resorption and dentist worried that it will cause infection and wants to extract right away. Concerned for infection.   Pt referred to oral surgeon.     Pt only s/p 6 weeks ablation.   Pt will need to have extraction and hold OAC.     Dentist Roscoe Urena

## 2020-12-08 NOTE — TELEPHONE ENCOUNTER
TO: 2:30pm/Mon/Office  NM: Sophie CURRY/ [facility]    PH: [phone]   PT NM: Emily Peter    : 1955   REG DR: Dr Blair    RE: Requesting Medical clearance  filed out and faxed back [phone]    DISP HIST: 2020 02:08P TQ P/DISP

## 2020-12-08 NOTE — TELEPHONE ENCOUNTER
To SS - please advise  Pt states she has some dental resorption and dentist worried that it will cause infection and wants to extract right away. Concerned for infection.   Pt referred to oral surgeon.      Pt only s/p 7 weeks ablation.   Pt will need to have extraction and hold OAC.      Dentist Roscoe Urena

## 2020-12-10 NOTE — TELEPHONE ENCOUNTER
Clearance filled out and sent. Called and discussed with office that she is only 7 week post ablation.   Veronica Blair M.D. You 15 hours ago (5:15 PM)     Cleared for dental surgery

## 2021-01-07 ENCOUNTER — TELEMEDICINE (OUTPATIENT)
Dept: CARDIOLOGY | Facility: MEDICAL CENTER | Age: 66
End: 2021-01-07
Payer: COMMERCIAL

## 2021-01-07 VITALS — BODY MASS INDEX: 27.44 KG/M2 | WEIGHT: 170 LBS

## 2021-01-07 DIAGNOSIS — Z98.890 H/O CARDIAC RADIOFREQUENCY ABLATION: ICD-10-CM

## 2021-01-07 DIAGNOSIS — Z79.01 CHRONIC ANTICOAGULATION: ICD-10-CM

## 2021-01-07 DIAGNOSIS — I48.0 PAROXYSMAL ATRIAL FIBRILLATION (HCC): ICD-10-CM

## 2021-01-07 PROCEDURE — 99212 OFFICE O/P EST SF 10 MIN: CPT | Mod: 95,CR | Performed by: NURSE PRACTITIONER

## 2021-01-07 RX ORDER — OXYCODONE HYDROCHLORIDE AND ACETAMINOPHEN 5; 325 MG/1; MG/1
TABLET ORAL
COMMUNITY
Start: 2020-12-28 | End: 2022-12-01

## 2021-01-07 RX ORDER — VALACYCLOVIR HYDROCHLORIDE 1 G/1
TABLET, FILM COATED ORAL
COMMUNITY
Start: 2020-11-08 | End: 2022-02-08

## 2021-01-07 RX ORDER — CEFDINIR 300 MG/1
CAPSULE ORAL
COMMUNITY
Start: 2020-12-17 | End: 2022-12-01

## 2021-01-07 RX ORDER — VALACYCLOVIR HYDROCHLORIDE 500 MG/1
TABLET, FILM COATED ORAL
COMMUNITY
Start: 2020-11-17 | End: 2022-12-01

## 2021-01-07 RX ORDER — NADOLOL 40 MG/1
TABLET ORAL
COMMUNITY
Start: 2020-12-27 | End: 2021-01-07 | Stop reason: SINTOL

## 2021-01-07 RX ORDER — FLUCONAZOLE 150 MG/1
TABLET ORAL
COMMUNITY
Start: 2020-12-27

## 2021-01-07 ASSESSMENT — FIBROSIS 4 INDEX: FIB4 SCORE: .959403223600246954

## 2021-01-07 NOTE — PROGRESS NOTES
Telephone Appointment Visit   As a means of avoiding spread of COVID-19, this visit is being conducted by telephone. This telephone visit was initiated by the patient and they verbally consented.    Time at start of call: 1530    Reason for Call:  Medication Follow-up and Symptom Follow-up    HPI:    Emily galvan is a 65 yoF.  She has a history of symptomatic atrial fibrillation, previously failed use of antiarrhythmics with longer, more frequent episodes.  She was scheduled for ablation, initial ablation was canceled due to COVID pandemic last spring and reschedule this fall.    She underwent ablation by Dr. Blair 10/15/20 with successful PVI procedure.     Since then she states she feels like her AF is going to start, but does not.  She is not aware of any known atrial fibrillation episodes.  She otherwise states that she has been doing well, but is having trouble with arthritis and chronic pain.  She is interested in potentially coming off Eliquis as she would like to be able to take NSAIDs for her pain issues.  She does not like taking opoids.        Labs / Images Reviewed:   Labs/echo reviewed.     Assessment and Plan:     1. Paroxysmal atrial fibrillation (HCC)  ZIO PATCH MONITOR   2. H/O cardiac radiofrequency ablation- PVI Dr. Blair 10/2020  ZIO PATCH MONITOR   3. Chronic anticoagulation         1. Paroxysmal atrial fibrillation (HCC)  - S/P ablation for symptomatic paroxysmal Atrial Fibrillation.  She reports that she has been doing well post ablation, not aware of any significant aAF episodes.  She would like to consider coming off anticoagulation as she has chronic pain/arthritis and would like to take NSAIDS.    - I have discussed with her, she has a low [id number] score of 1 currently, will be 2 in April.  Would like to obtain zio patch to check for subclinical arrhythmia prior to considering DC of Eliquis.  She is agreeable to this plan.     2. H/O cardiac radiofrequency ablation- PVI Dr. Blair 10/2020  -  As above.   3. Chronic anticoagulation  - Denies evidence of bleeding.       Follow-up: 6 weeks    Time at end of call: 0122  Total Time Spent: 11-20 minutes    PERRY Douglas 1/7/2021 3:58 PM

## 2021-02-24 ENCOUNTER — TELEPHONE (OUTPATIENT)
Dept: CARDIOLOGY | Facility: MEDICAL CENTER | Age: 66
End: 2021-02-24

## 2021-02-24 ENCOUNTER — NON-PROVIDER VISIT (OUTPATIENT)
Dept: CARDIOLOGY | Facility: MEDICAL CENTER | Age: 66
End: 2021-02-24
Attending: NURSE PRACTITIONER
Payer: COMMERCIAL

## 2021-02-24 DIAGNOSIS — Z98.890 H/O CARDIAC RADIOFREQUENCY ABLATION: ICD-10-CM

## 2021-02-24 DIAGNOSIS — I48.0 PAROXYSMAL ATRIAL FIBRILLATION (HCC): ICD-10-CM

## 2021-02-24 PROCEDURE — 99999 PR NO CHARGE: CPT | Performed by: INTERNAL MEDICINE

## 2021-02-24 NOTE — TELEPHONE ENCOUNTER
Patient enrolled in the 14 day Zio patch program per Sarah Alvarez APRN.(enrollment under Veronica Blair MD, patient's electophysiologist)  Patient took patch today and is going to hook it up on Monday, March 1.   >Currently pending EOS.

## 2021-06-25 ENCOUNTER — PATIENT MESSAGE (OUTPATIENT)
Dept: CARDIOLOGY | Facility: MEDICAL CENTER | Age: 66
End: 2021-06-25

## 2021-06-25 DIAGNOSIS — I48.0 PAF (PAROXYSMAL ATRIAL FIBRILLATION) (HCC): ICD-10-CM

## 2021-06-25 RX ORDER — NADOLOL 40 MG/1
40 TABLET ORAL DAILY
Qty: 90 TABLET | Refills: 2 | Status: SHIPPED | OUTPATIENT
Start: 2021-06-25 | End: 2022-04-07

## 2022-01-07 ENCOUNTER — TELEPHONE (OUTPATIENT)
Dept: CARDIOLOGY | Facility: MEDICAL CENTER | Age: 67
End: 2022-01-07

## 2022-01-07 NOTE — TELEPHONE ENCOUNTER
----- Message from Velia Ring R.N. sent at 1/7/2022 11:34 AM PST -----  Regarding: FW: Eliquis refill    ----- Message -----  From: Emily Peter  Sent: 1/6/2022   5:48 PM PST  To: Velia Ring R.N.  Subject: Eliquis refill                                   Thank you.

## 2022-01-07 NOTE — TELEPHONE ENCOUNTER
Emily Milliganlong Key: [id number] - PA Case ID: [id number] - Rx #: [id number] help? Call us at [phone]  Outcome  Additional Information Required  This request has been approved using information available on the patient's profile. CaseId:[id number];Status:Approved;Review Type:Prior Auth;Coverage Start Date:12/08/2021;Coverage End Date:01/07/2023;  Drug  Eliquis 5MG tablets  Form  [employer] Electronic PA Form (2017 NCPDP)  Original Claim Info  75

## 2022-02-07 NOTE — PROGRESS NOTES
Cardiology/Electrophysiology Follow-up Note      Subjective:   Chief Complaint:   Chief Complaint   Patient presents with   • Atrial Fibrillation     F/V Dx: Paroxysmal atrial fibrillation (HCC)       Emily Peter is a 66 y.o. female who presents today for follow up atrial fibrillation, previously experienced recurrent Afib despite use of antiarrhythmics and underwent ablation 10/15/20 with pulmonary vein isolation by Dr. Blair.     Last seen via virtual visit  1/7/21.  Past medical history significant for hypothyroidism, use of chronic anticoagulation, prior mastectomy.      Today in follow up she states that she has been doing well from a cardiac arrhythmia standpoint.  She reports a few brief palpaitions here and there, but overall less than her last office visit with us and nothing sustained and states very brief in durration.  She currently denies chest pain, dizziness, , pre syncope or syncope, dyspnea, PND, orthopnea, or lower extremity edema.        Patient endorses medication compliance        Past Medical History:   Diagnosis Date   • Anesthesia     pt states inability to sedate paternal grandma and niece/ pt has never had an issue   • Arthritis     hands neck   • Atrial fibrillation (HCC)     distant hx of   • Cataract 10/2020    tawana non surgically repaired at this time   • Hypothyroid    • Pain      Past Surgical History:   Procedure Laterality Date   • OTHER NEUROLOGICAL SURG  2003    cervical fusion   • LAMINOTOMY      acdf   • TONSILLECTOMY       Family History   Problem Relation Age of Onset   • Other Brother         explained syncopal episodes.    • Heart Attack Nephew 30     Social History     Socioeconomic History   • Marital status: Single     Spouse name: Not on file   • Number of children: Not on file   • Years of education: Not on file   • Highest education level: Not on file   Occupational History   • Not on file   Tobacco Use   • Smoking status: Former Smoker     Packs/day: 0.75      Years: 15.00     Pack years: 11.25     Quit date:      Years since quittin.1   • Smokeless tobacco: Never Used   Vaping Use   • Vaping Use: Never used   Substance and Sexual Activity   • Alcohol use: No   • Drug use: No   • Sexual activity: Not on file   Other Topics Concern   • Not on file   Social History Narrative    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      Social Determinants of Health     Financial Resource Strain:    • Difficulty of Paying Living Expenses: Not on file   Food Insecurity:    • Worried About Running Out of Food in the Last Year: Not on file   • Ran Out of Food in the Last Year: Not on file   Transportation Needs:    • Lack of Transportation (Medical): Not on file   • Lack of Transportation (Non-Medical): Not on file   Physical Activity:    • Days of Exercise per Week: Not on file   • Minutes of Exercise per Session: Not on file   Stress:    • Feeling of Stress : Not on file   Social Connections:    • Frequency of Communication with Friends and Family: Not on file   • Frequency of Social Gatherings with Friends and Family: Not on file   • Attends [employer] Services: Not on file   • Active Member of Clubs or Organizations: Not on file   • Attends Club or Organization Meetings: Not on file   • Marital Status: Not on file   Intimate Partner Violence:    • Fear of Current or Ex-Partner: Not on file   • Emotionally Abused: Not on file   • Physically Abused: Not on file   • Sexually Abused: Not on file   Housing Stability:    • Unable to Pay for Housing in the Last Year: Not on file   • Number of Places Lived in the Last Year: Not on file   • Unstable Housing in the Last Year: Not on file     Allergies   Allergen Reactions   • Cardizem Unspecified     Can cause the heart to stop for 2 seconds        Current Outpatient Medications   Medication Sig Dispense Refill   • apixaban (ELIQUIS) 5mg Tab Take 1 Tablet by mouth 2 times a day. PLEASE CALL [phone] AND SCHEDULE A FOLLOW UP APPOINTMENT FOR  FURTHER REFILLS. THANK YOU! 180 Tablet 0   • nadolol (CORGARD) 40 MG Tab Take 1 tablet by mouth every day. 90 tablet 2   • cefdinir (OMNICEF) 300 MG Cap      • valacyclovir (VALTREX) 1 GM Tab      • valACYclovir (VALTREX) 500 MG Tab      • oxyCODONE-acetaminophen (PERCOCET) 5-325 MG Tab      • fluconazole (DIFLUCAN) 150 MG tablet      • omeprazole (PRILOSEC) 20 MG delayed-release capsule Take 1 Cap by mouth every morning before breakfast. (Patient taking differently: Take 40 mg by mouth every morning before breakfast.) 30 Cap 0   • Levothyroxine Sodium (TIROSINT) 150 MCG Cap Take 1 Tab by mouth every day.     • loratadine (CLARITIN) 10 MG Tab Take 10 mg by mouth every day.     • VALACYCLOVIR HCL PO Take  by mouth every day.     • aspirin EC (ECOTRIN) 81 MG Tablet Delayed Response Take 81 mg by mouth every day.     • EVENING PRIMROSE OIL PO Take 1 Cap by mouth every day.     • vitamin e (VITAMIN E) 400 UNIT Cap Take 400 Units by mouth every day.     • Calcium Citrate-Vitamin D (CALCIUM CITRATE +D PO) Take  by mouth.     • Vitamin D, Cholecalciferol, 50 MCG (2000 UT) Cap Take 2,000 Units by mouth every day.     • liothyronine (CYTOMEL) 25 MCG TABS Take 12.5 mcg by mouth every day.       No current facility-administered medications for this visit.       Review of Systems   Constitutional: Negative for chills, fever, malaise/fatigue and weight loss.   Respiratory: Negative for cough, hemoptysis, sputum production, shortness of breath and wheezing.    Cardiovascular: Positive for palpitations. Negative for chest pain, orthopnea, leg swelling and PND.   Gastrointestinal: Negative for diarrhea, heartburn, nausea and vomiting.   Skin: Negative for rash.   Neurological: Negative for dizziness, tingling, tremors, sensory change, speech change, focal weakness, loss of consciousness and headaches.     All others systems reviewed and negative.     Objective:     /80 (BP Location: Left arm, Patient Position: Sitting, BP  "Cuff Size: Adult)   Pulse (!) 56   Resp 14   Ht 1.689 m (5' 6.5\")   Wt 77.6 kg (171 lb)   SpO2 97%  Body mass index is 27.19 kg/m².    Weight/BMI: Body mass index is 27.19 kg/m².  Wt Readings from Last 4 Encounters:   01/07/21 77.1 kg (170 lb)   10/15/20 77.8 kg (171 lb 8.3 oz)   04/23/20 77.7 kg (171 lb 4.8 oz)   01/29/20 78 kg (172 lb)         Physical Exam  Constitutional:       Appearance: Normal appearance.   HENT:      Head: Normocephalic and atraumatic.   Eyes:      Extraocular Movements: Extraocular movements intact.      Conjunctiva/sclera: Conjunctivae normal.      Pupils: Pupils are equal, round, and reactive to light.   Neck:      Vascular: No JVD.   Cardiovascular:      Rate and Rhythm: Normal rate and regular rhythm.      Heart sounds: Normal heart sounds. No murmur heard.    No friction rub. No gallop.   Pulmonary:      Effort: Pulmonary effort is normal. No respiratory distress.      Breath sounds: Normal breath sounds. No wheezing or rales.   Chest:      Chest wall: No tenderness.   Musculoskeletal:         General: Normal range of motion.      Cervical back: Normal range of motion and neck supple.   Skin:     General: Skin is warm and dry.      Capillary Refill: Capillary refill takes 2 to 3 seconds.      Findings: No erythema or rash.   Neurological:      Mental Status: She is alert and oriented to person, place, and time.   Psychiatric:         Mood and Affect: Mood and affect normal.         Behavior: Behavior normal.         Thought Content: Thought content normal.         Cognition and Memory: Memory normal.         Judgment: Judgment normal.       Cardiac Imaging and Procedures Review:    EKG (2/8/22) reviewed by myself:   Sinus tere    Echo (10/15/20):   MOOK CONCLUSIONS  MOOK done prior to AF ablation. Normal LV function. No significant   valuvlar pathology. CONNIE free of thrombus. Adequate velocities by PW   doppler. Intact interatrial septum.      EP Procedures:  AFib Ablation " 10/15/20  Total ablation time: 1461 seconds  Electrophysiologic Findings:    1. Sinus  977 ms, AH 60 ms, HV 39 ms. AVBCL = 344 ms.  Impressions:    1. Paroxysmal atrial fibrillation.    2. Successful RF ablation pulmonary vein isolation procedure.      Labs (personally reviewed and notable for):   BMP     CBC W/O DIFF    Lipids:     TSH:     THYROXINE (T4):   No results found for: SVETLANAIR      Assessment:     1. Paroxysmal atrial fibrillation (HCC)  EKG    [facility] ZIO PATCH MONITOR   2. H/O cardiac radiofrequency ablation- PVI Dr. Blair 10/2020     3. Chronic anticoagulation         Medical Decision Making:  Today's Assessment / Status / Plan:   1. Paroxysmal Afib  2. Ablation  - S/P previous ablation with Dr. Blair.  Clinically doing well. 12 laed EKG is sinus.  Some reported palps but states very brief and improved from our last year.  Discussed in increasing in frequency or sustaining will check zio patch.     3. Anticoagulation:  - Eliquis. [id number] score of 3 (age, gender).         Plan reviewed in detail with the patient and she verbalizes understanding and is in agreement.   RTC 6 months, sooner if clinical condition changes      Please note that this dictation was created using voice recognition software. I have made every reasonable attempt to correct obvious errors, but I expect that there are errors of grammar and possibly content that I did not discover before finalizing the note.    PAULO Douglas.

## 2022-02-08 ENCOUNTER — OFFICE VISIT (OUTPATIENT)
Dept: CARDIOLOGY | Facility: MEDICAL CENTER | Age: 67
End: 2022-02-08
Payer: COMMERCIAL

## 2022-02-08 VITALS
DIASTOLIC BLOOD PRESSURE: 80 MMHG | BODY MASS INDEX: 26.84 KG/M2 | HEIGHT: 67 IN | WEIGHT: 171 LBS | SYSTOLIC BLOOD PRESSURE: 118 MMHG | OXYGEN SATURATION: 97 % | RESPIRATION RATE: 14 BRPM | HEART RATE: 56 BPM

## 2022-02-08 DIAGNOSIS — Z98.890 H/O CARDIAC RADIOFREQUENCY ABLATION: ICD-10-CM

## 2022-02-08 DIAGNOSIS — Z79.01 CHRONIC ANTICOAGULATION: ICD-10-CM

## 2022-02-08 DIAGNOSIS — I48.0 PAROXYSMAL ATRIAL FIBRILLATION (HCC): ICD-10-CM

## 2022-02-08 PROCEDURE — 93000 ELECTROCARDIOGRAM COMPLETE: CPT | Performed by: INTERNAL MEDICINE

## 2022-02-08 PROCEDURE — 99213 OFFICE O/P EST LOW 20 MIN: CPT | Performed by: NURSE PRACTITIONER

## 2022-02-08 RX ORDER — AMOXICILLIN 500 MG/1
CAPSULE ORAL
COMMUNITY
Start: 2021-11-17

## 2022-02-08 RX ORDER — TRAMADOL HYDROCHLORIDE 50 MG/1
TABLET ORAL
COMMUNITY
Start: 2022-01-03

## 2022-02-08 ASSESSMENT — FIBROSIS 4 INDEX: FIB4 SCORE: 0.97

## 2022-02-11 LAB — EKG IMPRESSION: NORMAL

## 2022-04-05 DIAGNOSIS — I48.0 PAF (PAROXYSMAL ATRIAL FIBRILLATION) (HCC): ICD-10-CM

## 2022-04-06 ASSESSMENT — ENCOUNTER SYMPTOMS
HEARTBURN: 0
WEIGHT LOSS: 0
DIZZINESS: 0
SHORTNESS OF BREATH: 0
NAUSEA: 0
HEADACHES: 0
PALPITATIONS: 1
DIARRHEA: 0
HEMOPTYSIS: 0
COUGH: 0
CHILLS: 0
SPUTUM PRODUCTION: 0
VOMITING: 0
TINGLING: 0
SPEECH CHANGE: 0
LOSS OF CONSCIOUSNESS: 0
SENSORY CHANGE: 0
TREMORS: 0
FEVER: 0
ORTHOPNEA: 0
FOCAL WEAKNESS: 0
WHEEZING: 0
PND: 0

## 2022-04-07 RX ORDER — NADOLOL 40 MG/1
TABLET ORAL
Qty: 90 TABLET | Refills: 2 | Status: SHIPPED | OUTPATIENT
Start: 2022-04-07 | End: 2022-12-15

## 2022-04-15 ENCOUNTER — TELEPHONE (OUTPATIENT)
Dept: CARDIOLOGY | Facility: MEDICAL CENTER | Age: 67
End: 2022-04-15

## 2022-04-15 ENCOUNTER — APPOINTMENT (OUTPATIENT)
Dept: CARDIOLOGY | Facility: MEDICAL CENTER | Age: 67
End: 2022-04-15
Payer: COMMERCIAL

## 2022-04-15 DIAGNOSIS — R00.2 PALPITATIONS: ICD-10-CM

## 2022-04-15 DIAGNOSIS — I48.0 PAROXYSMAL ATRIAL FIBRILLATION (HCC): ICD-10-CM

## 2022-04-15 NOTE — PROGRESS NOTES
Per requesting zio in clinic now. Per EA last visit 2/8 pt to get zio for inc palps.     Pt is requesting zio to get off of blood thinners, advised pt that cannot guarantee results would allow her to come off blood thinners. María with monitors will relay this information and schedule a follow up with EP if pt would like

## 2022-04-18 ENCOUNTER — NON-PROVIDER VISIT (OUTPATIENT)
Dept: CARDIOLOGY | Facility: MEDICAL CENTER | Age: 67
End: 2022-04-18
Payer: COMMERCIAL

## 2022-04-18 ENCOUNTER — TELEPHONE (OUTPATIENT)
Dept: CARDIOLOGY | Facility: MEDICAL CENTER | Age: 67
End: 2022-04-18
Payer: COMMERCIAL

## 2022-04-18 DIAGNOSIS — I48.0 PAROXYSMAL ATRIAL FIBRILLATION (HCC): ICD-10-CM

## 2022-04-18 DIAGNOSIS — R00.2 PALPITATIONS: ICD-10-CM

## 2022-04-18 DIAGNOSIS — I47.19 ATRIAL TACHYCARDIA (HCC): ICD-10-CM

## 2022-04-18 NOTE — TELEPHONE ENCOUNTER
----- Message from PERRY Douglas sent at 4/18/2022  8:26 AM PDT -----  Regarding: FW: Discontinued order    ----- Message -----  From: María Winslow  Sent: 4/18/2022   7:58 AM PDT  To: PERRY Douglas  Subject: RE: Discontinued order                           I do need a new order please Daxa. Thank you.  ----- Message -----  From: PERRY Douglas  Sent: 4/18/2022   6:13 AM PDT  To: Daxa Pena R.N., María Winslow  Subject: FW: Discontinued order                           No, lets keep her on.  Daxa, Can you reorder monitor for her?  Zio?  Thank you  ----- Message -----  From: María Winslow  Sent: 4/15/2022  10:13 AM PDT  To: PERRY Douglas  Subject: Discontinued order                               Kristopher Smith, I have this patient on my schedule today at 3:00 PM. I see that her order is discontinued, can I remove her from the cardiac event monitor schedule for today? Thank you, María

## 2022-04-18 NOTE — PROGRESS NOTES
Patient enrolled in the 14 day ePatch Holter monitoring program from [facility], per PAULO Lopez.  >In clinic hook up, monitor serial #[id number].  >Pending EOS.

## 2022-04-19 DIAGNOSIS — I48.0 PAROXYSMAL ATRIAL FIBRILLATION (HCC): ICD-10-CM

## 2022-05-09 ENCOUNTER — TELEPHONE (OUTPATIENT)
Dept: CARDIOLOGY | Facility: MEDICAL CENTER | Age: 67
End: 2022-05-09
Payer: COMMERCIAL

## 2022-05-17 ENCOUNTER — HOSPITAL ENCOUNTER (OUTPATIENT)
Dept: RADIOLOGY | Facility: MEDICAL CENTER | Age: 67
End: 2022-05-17
Attending: FAMILY MEDICINE
Payer: COMMERCIAL

## 2022-05-17 DIAGNOSIS — N95.1 SYMPTOMATIC MENOPAUSAL OR FEMALE CLIMACTERIC STATES: ICD-10-CM

## 2022-05-17 DIAGNOSIS — Z12.31 VISIT FOR SCREENING MAMMOGRAM: ICD-10-CM

## 2022-05-17 PROCEDURE — 77080 DXA BONE DENSITY AXIAL: CPT

## 2022-05-17 PROCEDURE — 99999 PR NO CHARGE: CPT | Performed by: INTERNAL MEDICINE

## 2022-05-17 PROCEDURE — 77063 BREAST TOMOSYNTHESIS BI: CPT

## 2022-05-18 ENCOUNTER — TELEPHONE (OUTPATIENT)
Dept: CARDIOLOGY | Facility: MEDICAL CENTER | Age: 67
End: 2022-05-18
Payer: COMMERCIAL

## 2022-06-06 ENCOUNTER — OFFICE VISIT (OUTPATIENT)
Dept: CARDIOLOGY | Facility: MEDICAL CENTER | Age: 67
End: 2022-06-06
Payer: COMMERCIAL

## 2022-06-06 VITALS
SYSTOLIC BLOOD PRESSURE: 112 MMHG | BODY MASS INDEX: 25.74 KG/M2 | OXYGEN SATURATION: 97 % | HEIGHT: 67 IN | RESPIRATION RATE: 14 BRPM | DIASTOLIC BLOOD PRESSURE: 74 MMHG | HEART RATE: 66 BPM | WEIGHT: 164 LBS

## 2022-06-06 DIAGNOSIS — I48.0 PAROXYSMAL ATRIAL FIBRILLATION (HCC): ICD-10-CM

## 2022-06-06 PROCEDURE — 93000 ELECTROCARDIOGRAM COMPLETE: CPT | Performed by: INTERNAL MEDICINE

## 2022-06-06 PROCEDURE — 99214 OFFICE O/P EST MOD 30 MIN: CPT | Performed by: INTERNAL MEDICINE

## 2022-06-06 ASSESSMENT — FIBROSIS 4 INDEX: FIB4 SCORE: 0.99

## 2022-06-06 NOTE — PROGRESS NOTES
Arrhythmia Clinic Note (Established patient)    DOS: 2022    Chief complaint/Reason for consult: F/u AF    Interval History:  Pt is a 66 yo F. She has a history of symptomatic PAF. S/p prior PVI. Done well since. Repeat monitoring x 2 weeks showing no recurrence. Here for follow-up. No complaints today.    ROS (+ highlighted in red):  General--Negative for fatigue, weight loss or weight gain  Cardiovascular--Negative for CP, orthopnea, PND    Past Medical History:   Diagnosis Date   • Anesthesia     pt states inability to sedate paternal grandma and niece/ pt has never had an issue   • Arthritis     hands neck   • Atrial fibrillation (HCC)     distant hx of   • Cataract 10/2020    tawana non surgically repaired at this time   • Hypothyroid    • Pain        Past Surgical History:   Procedure Laterality Date   • OTHER NEUROLOGICAL SURG      cervical fusion   • LAMINOTOMY      acdf   • TONSILLECTOMY         Social History     Socioeconomic History   • Marital status: Single     Spouse name: Not on file   • Number of children: Not on file   • Years of education: Not on file   • Highest education level: Not on file   Occupational History   • Not on file   Tobacco Use   • Smoking status: Former Smoker     Packs/day: 0.75     Years: 15.00     Pack years: 11.25     Quit date:      Years since quittin.4   • Smokeless tobacco: Never Used   Vaping Use   • Vaping Use: Never used   Substance and Sexual Activity   • Alcohol use: No   • Drug use: No   • Sexual activity: Not on file   Other Topics Concern   • Not on file   Social History Narrative    MORALES and works for [employer].      Social Determinants of Health     Financial Resource Strain: Not on file   Food Insecurity: Not on file   Transportation Needs: Not on file   Physical Activity: Not on file   Stress: Not on file   Social Connections: Not on file   Intimate Partner Violence: Not on file   Housing Stability: Not on file       Family History   Problem  Relation Age of Onset   • Other Brother         explained syncopal episodes.    • Heart Attack Nephew 30       Allergies   Allergen Reactions   • Cardizem Unspecified     Can cause the heart to stop for 2 seconds    • Diltiazem Hcl Unspecified     Can cause the heart to stop for 2 seconds        Current Outpatient Medications   Medication Sig Dispense Refill   • apixaban (ELIQUIS) 5mg Tab Take 1 Tablet by mouth 2 times a day. 180 Tablet 3   • nadolol (CORGARD) 40 MG Tab TAKE ONE TABLET BY MOUTH DAILY 90 Tablet 2   • traMADol (ULTRAM) 50 MG Tab      • fluconazole (DIFLUCAN) 150 MG tablet      • Levothyroxine Sodium (TIROSINT) 150 MCG Cap Take 1 Tab by mouth every day.     • loratadine (CLARITIN) 10 MG Tab Take 10 mg by mouth every day.     • EVENING PRIMROSE OIL PO Take 1 Cap by mouth every day.     • vitamin e (VITAMIN E) 400 UNIT Cap Take 400 Units by mouth every day.     • Calcium Citrate-Vitamin D (CALCIUM CITRATE +D PO) Take  by mouth.     • Vitamin D, Cholecalciferol, 50 MCG (2000 UT) Cap Take 2,000 Units by mouth every day.     • liothyronine (CYTOMEL) 25 MCG TABS Take 12.5 mcg by mouth every day.     • amoxicillin (AMOXIL) 500 MG Cap  (Patient not taking: Reported on 6/6/2022)     • cefdinir (OMNICEF) 300 MG Cap  (Patient not taking: Reported on 6/6/2022)     • valACYclovir (VALTREX) 500 MG Tab  (Patient not taking: Reported on 6/6/2022)     • oxyCODONE-acetaminophen (PERCOCET) 5-325 MG Tab  (Patient not taking: Reported on 6/6/2022)     • omeprazole (PRILOSEC) 20 MG delayed-release capsule Take 1 Cap by mouth every morning before breakfast. (Patient not taking: Reported on 6/6/2022) 30 Cap 0   • VALACYCLOVIR HCL PO Take  by mouth every day. (Patient not taking: Reported on 6/6/2022)       No current facility-administered medications for this visit.       Physical Exam:  Vitals:    06/06/22 1612   BP: 112/74   BP Location: Left arm   Patient Position: Sitting   BP Cuff Size: Adult   Pulse: 66   Resp: 14  "  SpO2: 97%   Weight: 74.4 kg (164 lb)   Height: 1.689 m (5' 6.5\")     General appearance: NAD, conversant  HEENT: PERRL, neck is supple with FROM  Lungs: Clear to auscultation, normal respiratory effort  CV: RRR, no murmurs/rubs/gallops, no JVD  Abdomen: Soft, non-tender with normal bowel sounds  Extremities: No peripheral edema, no clubbing or cyanosis  Skin: No rash, lesions, or ulcers  Psych: Alert and oriented to person, place and time    Data:  Labs reviewed    Prior echo/stress reviewed:  LVEF 55%    EKG interpreted by me:  JULIAN Bassett 2 weeks seen, nonsustained AT, no AF    Impression/Plan:  1. Paroxysmal atrial fibrillation (HCC)  EKG     -Doing well  -CHADSVasc of 2  -We discussed risks/benefits of stopping OAC which I think is reasonable but not risk free  -She will think about what she wants to do in terms of OAC  -F/u in 6 weeks    Veronica Blair MD    "

## 2022-06-07 ENCOUNTER — TELEPHONE (OUTPATIENT)
Dept: CARDIOLOGY | Facility: MEDICAL CENTER | Age: 67
End: 2022-06-07
Payer: COMMERCIAL

## 2022-06-07 NOTE — TELEPHONE ENCOUNTER
----- Message from María Winslow sent at 6/6/2022  3:53 PM PDT -----  Regarding: Cardiac event monitor  Hi, me again. Can we get these tracings read and signed also? From 4/18/2022 placement.

## 2022-06-18 LAB — EKG IMPRESSION: NORMAL

## 2022-08-12 NOTE — ANESTHESIA PROCEDURE NOTES
Arterial Line  Performed by: Sherine Yates M.D.  Authorized by: Sherine Yates M.D.     Start Time:  10/15/2020 12:41 PM  End Time:  10/15/2020 12:42 PM  Localization: surface landmarks    Patient Location:  OR  Indication: continuous blood pressure monitoring and blood sampling needed        Catheter Size:  20 G  Seldinger Technique?: Yes    Laterality:  Right  Site:  Radial artery  Line Secured:  Antimicrobial disc, tape and transparent dressing  Events: patient tolerated procedure well with no complications           12

## 2022-12-01 ENCOUNTER — OFFICE VISIT (OUTPATIENT)
Dept: CARDIOLOGY | Facility: MEDICAL CENTER | Age: 67
End: 2022-12-01
Payer: COMMERCIAL

## 2022-12-01 VITALS
SYSTOLIC BLOOD PRESSURE: 112 MMHG | OXYGEN SATURATION: 96 % | HEART RATE: 59 BPM | HEIGHT: 67 IN | BODY MASS INDEX: 26.21 KG/M2 | DIASTOLIC BLOOD PRESSURE: 60 MMHG | RESPIRATION RATE: 14 BRPM | WEIGHT: 167 LBS

## 2022-12-01 DIAGNOSIS — I48.0 PAROXYSMAL ATRIAL FIBRILLATION (HCC): ICD-10-CM

## 2022-12-01 PROCEDURE — 93000 ELECTROCARDIOGRAM COMPLETE: CPT | Performed by: INTERNAL MEDICINE

## 2022-12-01 PROCEDURE — 99213 OFFICE O/P EST LOW 20 MIN: CPT | Performed by: INTERNAL MEDICINE

## 2022-12-01 NOTE — PROGRESS NOTES
Arrhythmia Clinic Note (Established patient)    DOS: 2022    Chief complaint/Reason for consult: F/u PAF    Interval History:  Pt is a 68 yo F. She has a history of PAF. S/p prior PV isolation. Subsequently has done well rhythm monitoring wise. No complaints today.     ROS (+ highlighted in red):  General--Negative for fatigue, weight loss or weight gain  Cardiovascular--Negative for CP, orthopnea, PND    Past Medical History:   Diagnosis Date    Anesthesia     pt states inability to sedate paternal grandma and niece/ pt has never had an issue    Arthritis     hands neck    Atrial fibrillation (HCC)     distant hx of    Cataract 10/2020    tawana non surgically repaired at this time    Hypothyroid     Pain        Past Surgical History:   Procedure Laterality Date    OTHER NEUROLOGICAL SURG  2003    cervical fusion    LAMINOTOMY      acdf    TONSILLECTOMY         Social History     Socioeconomic History    Marital status: Single     Spouse name: Not on file    Number of children: Not on file    Years of education: Not on file    Highest education level: Not on file   Occupational History    Not on file   Tobacco Use    Smoking status: Former     Packs/day: 0.75     Years: 15.00     Pack years: 11.25     Types: Cigarettes     Quit date:      Years since quittin.9    Smokeless tobacco: Never   Vaping Use    Vaping Use: Never used   Substance and Sexual Activity    Alcohol use: No    Drug use: No    Sexual activity: Not on file   Other Topics Concern    Not on file   Social History Narrative    MORALES and works for [employer].      Social Determinants of Health     Financial Resource Strain: Not on file   Food Insecurity: Not on file   Transportation Needs: Not on file   Physical Activity: Not on file   Stress: Not on file   Social Connections: Not on file   Intimate Partner Violence: Not on file   Housing Stability: Not on file       Family History   Problem Relation Age of Onset    Other Brother          "explained syncopal episodes.     Heart Attack Nephew 30       Allergies   Allergen Reactions    Cardizem Unspecified     Can cause the heart to stop for 2 seconds     Diltiazem Hcl Unspecified     Can cause the heart to stop for 2 seconds        Current Outpatient Medications   Medication Sig Dispense Refill    apixaban (ELIQUIS) 5mg Tab Take 1 Tablet by mouth 2 times a day. 180 Tablet 3    nadolol (CORGARD) 40 MG Tab TAKE ONE TABLET BY MOUTH DAILY 90 Tablet 2    amoxicillin (AMOXIL) 500 MG Cap       traMADol (ULTRAM) 50 MG Tab       fluconazole (DIFLUCAN) 150 MG tablet       Levothyroxine Sodium (TIROSINT) 150 MCG Cap Take 1 Tab by mouth every day.      loratadine (CLARITIN) 10 MG Tab Take 10 mg by mouth every day.      EVENING PRIMROSE OIL PO Take 1 Cap by mouth every day.      vitamin e (VITAMIN E) 400 UNIT Cap Take 400 Units by mouth every day.      Calcium Citrate-Vitamin D (CALCIUM CITRATE +D PO) Take  by mouth.      Vitamin D, Cholecalciferol, 50 MCG (2000 UT) Cap Take 2,000 Units by mouth every day.      liothyronine (CYTOMEL) 25 MCG TABS Take 12.5 mcg by mouth every day.      cefdinir (OMNICEF) 300 MG Cap  (Patient not taking: Reported on 6/6/2022)      valACYclovir (VALTREX) 500 MG Tab  (Patient not taking: Reported on 6/6/2022)      oxyCODONE-acetaminophen (PERCOCET) 5-325 MG Tab  (Patient not taking: Reported on 6/6/2022)      omeprazole (PRILOSEC) 20 MG delayed-release capsule Take 1 Cap by mouth every morning before breakfast. (Patient not taking: Reported on 6/6/2022) 30 Cap 0    VALACYCLOVIR HCL PO Take  by mouth every day. (Patient not taking: Reported on 6/6/2022)       No current facility-administered medications for this visit.       Physical Exam:  Vitals:    12/01/22 1442   BP: 112/60   BP Location: Right arm   Patient Position: Sitting   BP Cuff Size: Adult   Pulse: (!) 59   Resp: 14   SpO2: 96%   Weight: 75.8 kg (167 lb)   Height: 1.689 m (5' 6.5\")     General appearance: NAD, " conversant  HEENT: PERRL, neck is supple with FROM  Lungs: Clear to auscultation, normal respiratory effort  CV: RRR, no murmurs/rubs/gallops, no JVD  Abdomen: Soft, non-tender with normal bowel sounds  Extremities: No peripheral edema, no clubbing or cyanosis  Skin: No rash, lesions, or ulcers  Psych: Alert and oriented to person, place and time    Data:  Labs reviewed    Prior echo/stress reviewed:  LVEF 55%    EKG interpreted by me:  Sinus tere    Impression/Plan:  1. Paroxysmal atrial fibrillation (HCC)  EKG      -We again discussed her CHADSVasc of 2 and continuation of OAC  -After risks/benefits discussed she will continue the OAC  -No changes to regimen, f/u in 12 mo    Veronica Blair MD

## 2022-12-02 LAB — EKG IMPRESSION: NORMAL

## 2022-12-11 DIAGNOSIS — I48.0 PAF (PAROXYSMAL ATRIAL FIBRILLATION) (HCC): ICD-10-CM

## 2022-12-14 NOTE — TELEPHONE ENCOUNTER
Is the patient due for a refill? Yes    Was the patient seen the past year? Yes    Date of last office visit: 12/1/22    Does the patient have an upcoming appointment?  No   If yes, When?     Provider to refill:SS    Does the patients insurance require a 100 day supply?  No

## 2022-12-15 RX ORDER — NADOLOL 40 MG/1
TABLET ORAL
Qty: 90 TABLET | Refills: 3 | Status: SHIPPED | OUTPATIENT
Start: 2022-12-15 | End: 2023-03-14 | Stop reason: SDUPTHER

## 2023-03-14 DIAGNOSIS — I48.0 PAF (PAROXYSMAL ATRIAL FIBRILLATION) (HCC): ICD-10-CM

## 2023-03-14 DIAGNOSIS — I48.0 PAROXYSMAL ATRIAL FIBRILLATION (HCC): ICD-10-CM

## 2023-03-14 NOTE — TELEPHONE ENCOUNTER
Is the patient due for a refill? Yes- pharmacy change    Was the patient seen the past year? Yes    Date of last office visit: 12/1/2022    Does the patient have an upcoming appointment?  No   If yes, When?     Provider to refill:SS    Does the patients insurance require a 100 day supply?  No

## 2023-03-15 RX ORDER — NADOLOL 40 MG/1
40 TABLET ORAL DAILY
Qty: 90 TABLET | Refills: 2 | Status: SHIPPED | OUTPATIENT
Start: 2023-03-15 | End: 2023-11-29

## 2023-11-27 DIAGNOSIS — I48.0 PAF (PAROXYSMAL ATRIAL FIBRILLATION) (HCC): ICD-10-CM

## 2023-11-28 NOTE — TELEPHONE ENCOUNTER
Is the patient due for a refill? Yes    Was the patient seen the past year? Yes    Date of last office visit: 12/1/22    Does the patient have an upcoming appointment?  No       Provider to refill:SS    Does the patients insurance require a 100 day supply?  No

## 2023-11-29 RX ORDER — NADOLOL 40 MG/1
40 TABLET ORAL DAILY
Qty: 90 TABLET | Refills: 0 | Status: SHIPPED | OUTPATIENT
Start: 2023-11-29 | End: 2024-02-27

## 2023-12-21 NOTE — TELEPHONE ENCOUNTER
Chart Prep    LVM for pt regarding her appts for SS. Pt to [facility] in 8 months (Aug 2022) per EA's last note.    Pt currently scheduled 5/31/2022 and 8/23/2022 with SS.    LVM to see if 5/31 appt is needed, Asking for CB.   
Self

## 2024-02-25 DIAGNOSIS — I48.0 PAF (PAROXYSMAL ATRIAL FIBRILLATION) (HCC): ICD-10-CM

## 2024-02-26 NOTE — TELEPHONE ENCOUNTER
Is the patient due for a refill? Yes    Was the patient seen the past year? No    Date of last office visit: 10/12/2022    Does the patient have an upcoming appointment?  No   If yes, When?     Provider to refill:SS    Does the patients insurance require a 100 day supply?  No

## 2024-02-27 RX ORDER — NADOLOL 40 MG/1
TABLET ORAL
Qty: 90 TABLET | Refills: 0 | Status: SHIPPED | OUTPATIENT
Start: 2024-02-27

## 2024-03-15 ENCOUNTER — HOSPITAL ENCOUNTER (OUTPATIENT)
Dept: RADIOLOGY | Facility: MEDICAL CENTER | Age: 69
End: 2024-03-15
Attending: OBSTETRICS & GYNECOLOGY
Payer: COMMERCIAL

## 2024-04-24 ENCOUNTER — OFFICE VISIT (OUTPATIENT)
Dept: SURGERY | Facility: MEDICAL CENTER | Age: 69
End: 2024-04-24
Payer: COMMERCIAL

## 2024-04-24 VITALS
TEMPERATURE: 97.5 F | HEIGHT: 66 IN | DIASTOLIC BLOOD PRESSURE: 80 MMHG | WEIGHT: 172.2 LBS | OXYGEN SATURATION: 98 % | SYSTOLIC BLOOD PRESSURE: 156 MMHG | BODY MASS INDEX: 27.68 KG/M2 | HEART RATE: 56 BPM

## 2024-04-24 DIAGNOSIS — Z87.11 PERSONAL HISTORY OF PEPTIC ULCER DISEASE: ICD-10-CM

## 2024-04-24 DIAGNOSIS — K21.9 GASTROESOPHAGEAL REFLUX DISEASE, UNSPECIFIED WHETHER ESOPHAGITIS PRESENT: ICD-10-CM

## 2024-04-24 DIAGNOSIS — D22.9 NEVUS: ICD-10-CM

## 2024-04-24 PROCEDURE — 3079F DIAST BP 80-89 MM HG: CPT | Performed by: SURGERY

## 2024-04-24 PROCEDURE — 99204 OFFICE O/P NEW MOD 45 MIN: CPT | Performed by: SURGERY

## 2024-04-24 PROCEDURE — 3077F SYST BP >= 140 MM HG: CPT | Performed by: SURGERY

## 2024-04-24 ASSESSMENT — ENCOUNTER SYMPTOMS
BACK PAIN: 1
NERVOUS/ANXIOUS: 1
CONSTIPATION: 1
DEPRESSION: 1
ROS GI COMMENTS: OCCASIONAL

## 2024-04-24 NOTE — PROGRESS NOTES
Subjective:   4/24/2024  6:55 AM  Primary care provider:  Dianne Maurer M.D.  Gynecologist:  Dr. Herminia Mcgee  Imaging facility:  [facility]    Chief Complaint:   Chief Complaint   Patient presents with    New Patient     Left breast pain       History of presenting illness:  This very pleasant 69 y.o. year old female is kindly referred for consultation regarding BILATERAL nippple areolar rash and itching.  While she has had breast pain, left more than right, since her teenage years, she developed severe bilateral nipple areolar symptoms in December, 2023.  She has a chronic LEFT upper inner breast skin lesion that was felt to be benign a few years ago, but has grown since.  Her dermatologist retired.  There is no palpable breast lump, nipple discharge, or other changes on exam.  The rash itself is only on the areolar, never the nipple directly.  The symptoms resolve with hydrocortisone cream, but recur when discontinued.  Over the last few weeks, she has been using a Cetaphil cream and changed some of her soap, with some improvement.  No previous breast biopsies or breast surgery.  She just retired.       LABS:  Lab Results   Component Value Date/Time    HBA1C 5.4 07/20/2017 12:14 PM          IMAGING:  Bilateral diagnostic mammo 3/14/24 RDC:   Fatty parenchyma.  Last prior mammo 5/22.    Left breast US:   No suspicious findings in breast or axilla.   R1 (Azam)          Allergies   Allergen Reactions    Cardizem Unspecified     Can cause the heart to stop for 2 seconds     Diltiazem Hcl Unspecified     Can cause the heart to stop for 2 seconds        Current Outpatient Medications   Medication Sig    nadolol (CORGARD) 40 MG Tab TAKE 1 TABLET DAILY (PLEASE CALL TO SCHEDULE FOLLOW UP APPOINTMENT FOR FURTHER REFILLS. PLEASE CALL [phone]. THANK YOU)    traMADol (ULTRAM) 50 MG Tab     Levothyroxine Sodium (TIROSINT) 150 MCG Cap Take 1 Tab by mouth every day.    loratadine (CLARITIN) 10 MG Tab Take 10 mg by mouth every  day.    EVENING PRIMROSE OIL PO Take 1 Cap by mouth every day.    vitamin e (VITAMIN E) 400 UNIT Cap Take 400 Units by mouth every day.    Calcium Citrate-Vitamin D (CALCIUM CITRATE +D PO) Take  by mouth.    Vitamin D, Cholecalciferol, 50 MCG (2000 UT) Cap Take 2,000 Units by mouth every day.    liothyronine (CYTOMEL) 25 MCG TABS Take 12.5 mcg by mouth every day.       Patient Active Problem List   Diagnosis    Congenital hypothyroidism with diffuse goiter    Paroxysmal atrial fibrillation (HCC)    Low back pain    H/O cardiac radiofrequency ablation- PVI Dr. Blair 10/2020    Gastroesophageal reflux disease    Personal history of peptic ulcer disease     Past Surgical History:   Procedure Laterality Date    OTHER NEUROLOGICAL SURG      cervical fusion    LAMINOTOMY      acdf    OTHER CARDIAC SURGERY      Cardiac ablation     TONSILLECTOMY         Social History     Tobacco Use    Smoking status: Former     Current packs/day: 0.00     Average packs/day: 0.8 packs/day for 15.0 years (11.3 ttl pk-yrs)     Types: Cigarettes     Start date:      Quit date:      Years since quittin.3    Smokeless tobacco: Never   Vaping Use    Vaping Use: Never used   Substance Use Topics    Alcohol use: No    Drug use: No     Family and Occupational History     Socioeconomic History    Marital status: Single     Spouse name: Not on file    Number of children: Not on file    Years of education: Not on file    Highest education level: Not on file   Occupational History    Not on file      OB History    Para Term  AB Living   1 0 0 0 1 0   SAB IAB Ectopic Molar Multiple Live Births   1 0 0 0 0 0   Obstetric Comments   Menarche: 11   LMP: 2014, No HRT       Family History   Problem Relation Age of Onset    Other Brother         explained syncopal episodes.     Prostate cancer Brother     Heart Attack Nephew 30       Review of Systems   Gastrointestinal:  Positive for constipation.        Occasional   "  Musculoskeletal:  Positive for back pain.        Occasional    Psychiatric/Behavioral:  Positive for depression. The patient is nervous/anxious.         Objective:   BP (!) 156/80 (BP Location: Left arm, Patient Position: Sitting, BP Cuff Size: Large adult)   Pulse (!) 56   Temp 36.4 °C (97.5 °F) (Temporal)   Ht 1.676 m (5' 6\")   Wt 78.1 kg (172 lb 3.2 oz)   SpO2 98%   BMI 27.79 kg/m²       Physical Exam  Constitutional:       Appearance: Normal appearance.   Cardiovascular:      Rate and Rhythm: Regular rhythm.      Heart sounds: Normal heart sounds.   Pulmonary:      Effort: Pulmonary effort is normal.      Breath sounds: Normal breath sounds.   Skin:     Comments: See scanned breast diagram   Neurological:      Mental Status: She is alert.   Psychiatric:         Mood and Affect: Mood normal.         Diagnosis:     1. Nevus  Referral to Dermatology      2. Gastroesophageal reflux disease, unspecified whether esophagitis present        3. Personal history of peptic ulcer disease            Medical Decision Making:  Today's Assessment / Status / Plan:     ASSESSMENT:  Bilateral areolar rash, left worse than right, with relative sparing of the nipple.  Severe . She has recurrence when steroid creams are discontinued.      Changing LEFT upper inner breast skin lesion.      Last bilateral mammogram 3/14/24 RDC:  Fatty parenchyma.  Last prior mammo .    FH:  Brother prostate cancer 63.  No AJ heritage.      Menopausal/HRT status:  .  Menarche 11. LMP .  No HRT.      LIFESTYLE:  Quit social smoking .  No alcohol or drug use.  BMI 27.      ECOG 0= Fully active, able to carry on all pre-disease performance without restriction.     DISCUSSED:  I personally reviewed and independently interpreted her breast imaging including mammograms and US, and relevant outside records.  We discussed the sometimes slow and insidious development of Paget disease of the breast (cancer of the nipple) which, in " the earliest phase, can be quite subtle.  Sometimes, however, multiple data points are needed with close monitoring for changes. We discussed risks and benefits of biopsy of the areolar abnormality versus ongoing conservative management.  Most likely this represents eczema or other benign findings, but biopsy would be more definitive.  After further discussion, she desires proceeding with biopsy.   I did emphasize that even if benign, she needs to continue monitoring for further changes over time.      We will refer her to Dermatology to assess her LEFT upper inner breast skin lesion and any others of concern.      Total time spent today, including personal review and independent interpretation of available breast imaging, outside records, face to face time, chart documentation, and , was 50 minutes.     PLAN:  Biopsy of LEFT areolar abnormality    REFERRALS:    Dermatology for unrelated skin lesion and general evaluation for skin changes.

## 2024-05-09 ENCOUNTER — APPOINTMENT (RX ONLY)
Dept: URBAN - METROPOLITAN AREA CLINIC 31 | Facility: CLINIC | Age: 69
Setting detail: DERMATOLOGY
End: 2024-05-09

## 2024-05-09 DIAGNOSIS — R21 RASH AND OTHER NONSPECIFIC SKIN ERUPTION: ICD-10-CM | Status: INADEQUATELY CONTROLLED

## 2024-05-09 PROCEDURE — ? COUNSELING

## 2024-05-09 PROCEDURE — 99203 OFFICE O/P NEW LOW 30 MIN: CPT

## 2024-05-09 PROCEDURE — ? ADDITIONAL NOTES

## 2024-05-09 ASSESSMENT — LOCATION SIMPLE DESCRIPTION DERM: LOCATION SIMPLE: LEFT BREAST

## 2024-05-09 ASSESSMENT — LOCATION DETAILED DESCRIPTION DERM: LOCATION DETAILED: LEFT AREOLA

## 2024-05-09 ASSESSMENT — LOCATION ZONE DERM: LOCATION ZONE: TRUNK

## 2024-05-09 ASSESSMENT — SEVERITY ASSESSMENT: SEVERITY: MILD TO MODERATE

## 2024-05-09 NOTE — HPI: RASH
What Type Of Note Output Would You Prefer (Optional)?: Standard Output
Is The Patient Presenting As Previously Scheduled?: Yes
Is This A New Presentation, Or A Follow-Up?: Rash
Additional History: Biopsy scheduled in June PCP

## 2024-05-09 NOTE — PROCEDURE: ADDITIONAL NOTES
Render Risk Assessment In Note?: no
Additional Notes: Patient reports she has tried and failed multiple topical therapies with referring provider. Patient declined trial of additional topical therapies at this time. Patient requesting purely clinical diagnosis; discussed clinically consistent with Eczematous skin changes, however to confirm diagnosis biopsy recommended. Discussed recommendation for biopsy to confirm diagnosis and rule out atypical or malignant processes due to recalcitrant to typical therapies. Patient declines.\\n\\nPatient reports she is established with Dr. Cohen. Patient reports has an upcoming scheduled biopsy for rash. Pt declined biopsy today as she is already scheduled with referring provider.
Patient Management Risk Assessment: Moderate
Detail Level: Simple

## 2024-05-09 NOTE — PROCEDURE: MIPS QUALITY
Please start HCTZ 12.5mg daily. Monitor blood pressure outside the office several times weekly at different times during the day and evening. Blood Pressure Record     Patient Name:  ______________________ :  ______________________    Date/Time BP Reading Pulse
Quality 226: Preventive Care And Screening: Tobacco Use: Screening And Cessation Intervention: Patient screened for tobacco use and is an ex/non-smoker
Detail Level: Detailed
Quality 130: Documentation Of Current Medications In The Medical Record: Current Medications Documented

## 2024-06-03 ENCOUNTER — TELEPHONE (OUTPATIENT)
Dept: SURGERY | Facility: MEDICAL CENTER | Age: 69
End: 2024-06-03
Payer: MEDICARE

## 2024-06-03 NOTE — TELEPHONE ENCOUNTER
Discussed that the skin lesion that she was referred to derm for was a different lesion than the one we would biopsy in our office. I advised her to remake an appointment with dermatology to get that looked at and patient is amenable to that plan. Patient states that her rash is currently not present today. I offered the patient the option to keep her biopsy appointment on Friday if she knows where on her areola the rash typically occurs or she can call [facility] directly to schedule the procedure once the rash is present again. Patient would like to keep her appointment this Friday as she knows where the rash typically occurs. All patient questions answered.

## 2024-06-06 ENCOUNTER — OFFICE VISIT (OUTPATIENT)
Dept: CARDIOLOGY | Facility: MEDICAL CENTER | Age: 69
End: 2024-06-06
Attending: NURSE PRACTITIONER
Payer: MEDICARE

## 2024-06-06 VITALS
OXYGEN SATURATION: 97 % | BODY MASS INDEX: 27.97 KG/M2 | HEART RATE: 56 BPM | HEIGHT: 66 IN | SYSTOLIC BLOOD PRESSURE: 116 MMHG | DIASTOLIC BLOOD PRESSURE: 72 MMHG | WEIGHT: 174 LBS | RESPIRATION RATE: 12 BRPM

## 2024-06-06 DIAGNOSIS — I48.0 PAF (PAROXYSMAL ATRIAL FIBRILLATION) (HCC): ICD-10-CM

## 2024-06-06 DIAGNOSIS — I48.0 PAROXYSMAL ATRIAL FIBRILLATION (HCC): ICD-10-CM

## 2024-06-06 DIAGNOSIS — Z98.890 H/O CARDIAC RADIOFREQUENCY ABLATION: ICD-10-CM

## 2024-06-06 LAB — EKG IMPRESSION: NORMAL

## 2024-06-06 PROCEDURE — 93010 ELECTROCARDIOGRAM REPORT: CPT | Performed by: NURSE PRACTITIONER

## 2024-06-06 PROCEDURE — 3074F SYST BP LT 130 MM HG: CPT | Performed by: NURSE PRACTITIONER

## 2024-06-06 PROCEDURE — 3078F DIAST BP <80 MM HG: CPT | Performed by: NURSE PRACTITIONER

## 2024-06-06 PROCEDURE — 99212 OFFICE O/P EST SF 10 MIN: CPT | Performed by: NURSE PRACTITIONER

## 2024-06-06 PROCEDURE — 99214 OFFICE O/P EST MOD 30 MIN: CPT | Performed by: NURSE PRACTITIONER

## 2024-06-06 PROCEDURE — 93005 ELECTROCARDIOGRAM TRACING: CPT | Performed by: NURSE PRACTITIONER

## 2024-06-06 RX ORDER — NADOLOL 40 MG/1
40 TABLET ORAL DAILY
Qty: 90 TABLET | Refills: 3 | Status: SHIPPED | OUTPATIENT
Start: 2024-06-06

## 2024-06-06 ASSESSMENT — ENCOUNTER SYMPTOMS
CLAUDICATION: 0
SHORTNESS OF BREATH: 0
DIZZINESS: 0
PND: 0
PALPITATIONS: 0
WEAKNESS: 0
ORTHOPNEA: 0
WEIGHT LOSS: 0

## 2024-06-06 NOTE — PROGRESS NOTES
Chief Complaint   Patient presents with    Atrial Fibrillation     F/V DX: Paroxysmal atrial fibrillation (HCC)       Subjective     Emily Peter is a 69 y.o. female patient of Dr. Blair who presents today     Patient was last seen by Dr. Blair on 6/6/2022. Risks and benefits of stopping OAC was discussed, it was felt to be a reasonable option but not risk free. Patient wished to think about it.     PMH pertinent for pAF H/O PVI and hypothyroidism.    Today patient states that she is overall feeling well. She has been having some intermittent very brief (few seconds) episodes of dizziness. Primarily occurs when she is at rest, it is no associated with position change or with standing. She just worked out with a  last week on three different days and did not have any dizziness or other cardiac symptoms. Denies having any A-Fib recurrence. Would like to have her Nadolol refilled for the year.     Past Medical History:   Diagnosis Date    Anesthesia     pt states inability to sedate paternal grandma and niece/ pt has never had an issue    Arthritis     hands neck    Atrial fibrillation (HCC)     distant hx of    Cataract 10/2020    tawana non surgically repaired at this time    Hypothyroid     Pain      Past Surgical History:   Procedure Laterality Date    OTHER NEUROLOGICAL SURG  2003    cervical fusion    LAMINOTOMY      acdf    OTHER CARDIAC SURGERY      Cardiac ablation 2022    TONSILLECTOMY       Family History   Problem Relation Age of Onset    Other Brother         explained syncopal episodes.     Prostate cancer Brother     Heart Attack Nephew 30     Social History     Socioeconomic History    Marital status: Single     Spouse name: Not on file    Number of children: Not on file    Years of education: Not on file    Highest education level: Not on file   Occupational History    Not on file   Tobacco Use    Smoking status: Former     Current packs/day: 0.00     Average packs/day: 0.8 packs/day  for 15.0 years (11.3 ttl pk-yrs)     Types: Cigarettes     Start date:      Quit date:      Years since quittin.4    Smokeless tobacco: Never   Vaping Use    Vaping status: Never Used   Substance and Sexual Activity    Alcohol use: No    Drug use: No    Sexual activity: Not on file   Other Topics Concern    Not on file   Social History Narrative    MORALES and works for [employer].      Social Determinants of Health     Financial Resource Strain: Not on file   Food Insecurity: Not on file   Transportation Needs: Not on file   Physical Activity: Not on file   Stress: Not on file   Social Connections: Not on file   Intimate Partner Violence: Not on file   Housing Stability: Not on file     Allergies   Allergen Reactions    Cardizem Unspecified     Can cause the heart to stop for 2 seconds     Diltiazem Hcl Unspecified     Can cause the heart to stop for 2 seconds      Outpatient Encounter Medications as of 2024   Medication Sig Dispense Refill    CRANBERRY PO Take  by mouth.      nadolol (CORGARD) 40 MG Tab Take 1 Tablet by mouth every day. 90 Tablet 3    traMADol (ULTRAM) 50 MG Tab       Levothyroxine Sodium (TIROSINT) 150 MCG Cap Take 1 Tab by mouth every day.      loratadine (CLARITIN) 10 MG Tab Take 10 mg by mouth every day.      EVENING PRIMROSE OIL PO Take 1 Cap by mouth every day.      vitamin e (VITAMIN E) 400 UNIT Cap Take 400 Units by mouth every day.      Calcium Citrate-Vitamin D (CALCIUM CITRATE +D PO) Take  by mouth.      Vitamin D, Cholecalciferol, 50 MCG (2000 UT) Cap Take 2,000 Units by mouth every day.      liothyronine (CYTOMEL) 25 MCG TABS Take 12.5 mcg by mouth every day.      [DISCONTINUED] nadolol (CORGARD) 40 MG Tab TAKE 1 TABLET DAILY (PLEASE CALL TO SCHEDULE FOLLOW UP APPOINTMENT FOR FURTHER REFILLS. PLEASE CALL [phone]. THANK YOU) 90 Tablet 0     No facility-administered encounter medications on file as of 2024.     Review of Systems   Constitutional:  Negative for  "malaise/fatigue and weight loss.   Respiratory:  Negative for shortness of breath.    Cardiovascular:  Negative for chest pain, palpitations, orthopnea, claudication, leg swelling and PND.   Neurological:  Negative for dizziness (Occasional brief episodes of dizziness) and weakness.   All other systems reviewed and are negative.             Objective     /72 (BP Location: Left arm, Patient Position: Sitting, BP Cuff Size: Adult)   Pulse (!) 56   Resp 12   Ht 1.676 m (5' 6\")   Wt 78.9 kg (174 lb)   SpO2 97%   BMI 28.08 kg/m²     Physical Exam  Constitutional:       General: She is not in acute distress.     Appearance: She is well-developed.   HENT:      Head: Normocephalic.   Eyes:      Extraocular Movements: Extraocular movements intact.   Neck:      Vascular: No carotid bruit or JVD.   Cardiovascular:      Rate and Rhythm: Normal rate and regular rhythm.      Heart sounds: Normal heart sounds. No murmur heard.  Pulmonary:      Effort: No respiratory distress.      Breath sounds: Normal breath sounds.   Musculoskeletal:      Cervical back: Normal range of motion.      Right lower leg: No edema.      Left lower leg: No edema.   Skin:     General: Skin is warm and dry.   Neurological:      Mental Status: She is alert and oriented to person, place, and time.   Psychiatric:         Behavior: Behavior normal.                Assessment & Plan     1. Paroxysmal atrial fibrillation (HCC)  EKG    EC-ECHOCARDIOGRAM COMPLETE W/O CONT      2. PAF (paroxysmal atrial fibrillation) (HCC)  nadolol (CORGARD) 40 MG Tab      3. H/O cardiac radiofrequency ablation- PVI Dr. Blair 10/2020            Medical Decision Making: Today's Assessment/Status/Plan:          pAF:  - S/P PVI in 2020.  - Maintaining NSR.  - Continue Nadolol 40 mg daily.   - Not on OAC after risk/benefit discussion with Dr. Blair on 2022. Will need to resume if she has A-Fib recurrence.      Dizziness:  - Unfortunately episodes occur very infrequently, likely " unable to catch on cardiac monitor. Discussed that we can do a cardiac monitor if the frequency increases.  - Repeat TTE ordered to r/o structural abnormality.       Patient will follow up as scheduled below or earlier if needed. Encouraged patient to contact our office should any questions or concerns arise in the mean time.       Future Appointments   Date Time Provider Department Center   6/7/2024 10:00 AM Dania Aguilar M.D. [facility] Breast None   8/29/2024 10:15 AM [facility] EXAM 10 [address] [facility]

## 2024-06-07 ENCOUNTER — HOSPITAL ENCOUNTER (OUTPATIENT)
Facility: MEDICAL CENTER | Age: 69
End: 2024-06-07
Attending: OBSTETRICS & GYNECOLOGY
Payer: MEDICARE

## 2024-06-07 ENCOUNTER — APPOINTMENT (OUTPATIENT)
Dept: SURGERY | Facility: MEDICAL CENTER | Age: 69
End: 2024-06-07
Payer: MEDICARE

## 2024-06-07 VITALS
BODY MASS INDEX: 28.19 KG/M2 | TEMPERATURE: 97.3 F | DIASTOLIC BLOOD PRESSURE: 90 MMHG | HEIGHT: 66 IN | WEIGHT: 175.4 LBS | HEART RATE: 54 BPM | OXYGEN SATURATION: 94 % | SYSTOLIC BLOOD PRESSURE: 130 MMHG

## 2024-06-07 DIAGNOSIS — L98.8 SKIN LESION OF BREAST: ICD-10-CM

## 2024-06-07 LAB — PATHOLOGY CONSULT NOTE: NORMAL

## 2024-06-07 PROCEDURE — 19120 REMOVAL OF BREAST LESION: CPT | Mod: LT | Performed by: SURGERY

## 2024-06-07 PROCEDURE — 88305 TISSUE EXAM BY PATHOLOGIST: CPT

## 2024-06-07 PROCEDURE — 88312 SPECIAL STAINS GROUP 1: CPT

## 2024-06-07 NOTE — PROGRESS NOTES
6/7/2024 10:05 AM    Primary care provider:  Dianne Maurer M.D.  Gynecologist:  Dr. Herminia Mcgee  Imaging facility:  [facility]    CC:   Chief Complaint   Patient presents with    Procedure     Left areolar biopsy.        HPI: This very pleasant 69 y.o. female returns for biopsy of a recurrent areolar rash.  She had called earlier reporting resolution, but it did recur soon after that.  She is very nervous about this procedure.        LABS:  Lab Results   Component Value Date/Time    HBA1C 5.4 07/20/2017 12:14 PM          Allergies   Allergen Reactions    Cardizem Unspecified     Can cause the heart to stop for 2 seconds     Diltiazem Hcl Unspecified     Can cause the heart to stop for 2 seconds      Current Outpatient Medications   Medication Sig    CRANBERRY PO Take  by mouth.    nadolol (CORGARD) 40 MG Tab Take 1 Tablet by mouth every day.    traMADol (ULTRAM) 50 MG Tab     Levothyroxine Sodium (TIROSINT) 150 MCG Cap Take 1 Tab by mouth every day.    loratadine (CLARITIN) 10 MG Tab Take 10 mg by mouth every day.    EVENING PRIMROSE OIL PO Take 1 Cap by mouth every day.    vitamin e (VITAMIN E) 400 UNIT Cap Take 400 Units by mouth every day.    Calcium Citrate-Vitamin D (CALCIUM CITRATE +D PO) Take  by mouth.    Vitamin D, Cholecalciferol, 50 MCG (2000 UT) Cap Take 2,000 Units by mouth every day.    liothyronine (CYTOMEL) 25 MCG TABS Take 12.5 mcg by mouth every day.       Patient Active Problem List   Diagnosis    Congenital hypothyroidism with diffuse goiter    Paroxysmal atrial fibrillation (HCC)    Low back pain    H/O cardiac radiofrequency ablation- PVI Dr. Blair 10/2020    Gastroesophageal reflux disease    Personal history of peptic ulcer disease     Past Surgical History:   Procedure Laterality Date    OTHER NEUROLOGICAL SURG  2003    cervical fusion    LAMINOTOMY      acdf    OTHER CARDIAC SURGERY      Cardiac ablation 2022    TONSILLECTOMY         Social History     Tobacco Use    Smoking status: Former      "Current packs/day: 0.00     Average packs/day: 0.8 packs/day for 15.0 years (11.3 ttl pk-yrs)     Types: Cigarettes     Start date:      Quit date:      Years since quittin.4    Smokeless tobacco: Never   Vaping Use    Vaping status: Never Used   Substance Use Topics    Alcohol use: No    Drug use: No     Family and Occupational History     Socioeconomic History    Marital status: Single     Spouse name: Not on file    Number of children: Not on file    Years of education: Not on file    Highest education level: Not on file   Occupational History    Not on file     OB History    Para Term  AB Living   1 0 0 0 1 0   SAB IAB Ectopic Molar Multiple Live Births   1 0 0 0 0 0   Obstetric Comments   Menarche: 11   LMP: , No HRT       Family History   Problem Relation Age of Onset    Other Brother         explained syncopal episodes.     Prostate cancer Brother     Heart Attack Nephew 30         Objective:  BP (!) 130/90 (BP Location: Left arm, Patient Position: Sitting, BP Cuff Size: Large adult)   Pulse (!) 54   Temp 36.3 °C (97.3 °F) (Temporal)   Ht 1.676 m (5' 6\")   Wt 79.6 kg (175 lb 6.4 oz)   SpO2 94%   BMI 28.31 kg/m²       Diagnosis:  1. Skin lesion of breast            ASSESSMENT:  Bilateral areolar rash, left worse than right, with relative sparing of the nipple.  Severe . She has recurrence when steroid creams are discontinued.       Changing LEFT upper inner breast skin lesion.       Last bilateral mammogram 3/14/24 RDC:  Fatty parenchyma.  Last prior mammo .     FH:  Brother prostate cancer 63.  No AJ heritage.       Menopausal/HRT status:  .  Menarche 11. LMP .  No HRT.       LIFESTYLE:  Quit social smoking .  No alcohol or drug use.  BMI 27.       ECOG 0= Fully active, able to carry on all pre-disease performance without restriction.       PROCEDURE:  Excisional biopsy of LEFT nipple areolar lesion  After obtaining informed consent, the patient was " placed supine and her breast was prepped and she was draped in sterile fashion.  I injected local anesthetic, then excised a full thickness ellipse of skin (1cm) in the area of persistent rash, immediately superior to the nipple.  A small portion of nipple border was included.  Hemostasis was ensured, then 3-0 vicryl was used to close the deep dermal layer and 5-0 fast absorbing gut was used for skin.  Dressing was placed and she was given standard wound care instructions.  She tolerated the procedure well after she got through the local anesthetic injection.      She will return in 7-10 days for wound check and discussion of results.  We encouraged her to call with questions or concerns in the meantime.

## 2024-06-08 LAB
ALBUMIN SERPL BCP-MCNC: 4.5 G/DL (ref 3.6–5.1)
ALBUMIN/GLOB SERPL: 1.9 {RATIO} (ref 1–2.5)
ALP SERPL-CCNC: 80 U/L (ref 37–153)
ALT SERPL-CCNC: 18 U/L (ref 6–29)
AST SERPL-CCNC: 20 U/L (ref 10–35)
BASOPHILS # BLD AUTO: 52 10*3/UL (ref 0–200)
BASOPHILS NFR BLD AUTO: 1 %
BILIRUB SERPL-MCNC: 0.5 MG/DL (ref 0.2–1.2)
BUN SERPL-MCNC: 13 MG/DL (ref 7–25)
CALCIUM SERPL-MCNC: 10.3 MG/DL (ref 8.6–10.4)
CHLORIDE SERPL-SCNC: 103 MMOL/L (ref 95–110)
CHOLEST SERPL-MCNC: 199 MG/DL (ref ?–200)
CO2 SERPL-SCNC: 29 MMOL/L (ref 20–32)
CREAT SERPL-MCNC: 0.62 MG/DL (ref 0.5–1.05)
EOSINOPHIL # BLD MANUAL: 73 10*3/UL (ref 15–500)
EOSINOPHIL NFR BLD AUTO: 1.4 %
ERYTHROCYTE [DISTWIDTH] IN BLOOD BY AUTOMATED COUNT: 12.9 % (ref 11–15)
GFR/BSA.PRED SERPLBLD CYS-BASED-ARV: 97 ML/MIN/{1.73_M2} (ref 60–?)
GLOBULIN SER CALC-MCNC: 2.4 G/L (ref 1.9–3.7)
GLUCOSE SERPL-MCNC: 84 MG/DL (ref 65–99)
HBA1C MFR BLD: 5.6 % (ref ?–5.8)
HCT VFR BLD AUTO: 45.7 % (ref 35–45)
HDLC SERPL-MCNC: 56 MG/DL (ref 50–?)
HGB BLD-MCNC: 15.1 G/DL (ref 11.7–15.5)
LDLC SERPL CALC-MCNC: 124 MG/DL (ref ?–100)
LYMPHOCYTES # BLD: 1674 10*3/UL (ref 850–3900)
LYMPHOCYTES NFR BLD AUTO: 32.2 %
Lab: 6.8 PG/ML (ref 2.3–4.2)
Lab: 69 (ref 30–100)
MCH RBC QN AUTO: 28.1 PG (ref 27–33)
MCHC RBC AUTO-ENTMCNC: 33 G/DL (ref 32–36)
MCV RBC AUTO: 84.9 FL (ref 80–100)
MONOCYTES # BLD AUTO: 369 10*3/UL (ref 200–950)
MONOCYTES NFR BLD MANUAL: 7.1 %
NEUTROPHILS # BLD: 3032 10*3/UL (ref 1500–7800)
NEUTROPHILS NFR BLD AUTO: 58.3 %
PLATELET # BLD AUTO: 296 10*3/UL (ref 140–400)
PMV BLD AUTO: 109 FL (ref 7.5–12.5)
POTASSIUM SERPL-SCNC: 4.7 MMOL/L (ref 3.5–5.3)
PROT SERPL-MCNC: 6.9 G/DL (ref 6.1–8.1)
RBC # BLD AUTO: 5.38 10*6/UL (ref 3.8–5.1)
SODIUM SERPL-SCNC: 139 MMOL/L (ref 135–146)
T4 FREE SERPL-MCNC: 2 NG/DL (ref 0.8–1.8)
TRIGL SERPL-MCNC: 89 MG/DL (ref ?–150)
TSH SERPL DL<=0.005 MIU/L-ACNC: <0.01 M[IU]/L (ref 0.4–4.5)
WBC # BLD AUTO: 5.2 10*3/UL (ref 3.8–10.8)

## 2024-06-19 NOTE — PROGRESS NOTES
"6/26/2024  8:38 AM    Primary care provider:  Dianne Maurer M.D.  Gynecologist:  Dr. Herminia Mcgee  Imaging facility:  [facility]    CC:   Chief Complaint   Patient presents with    Follow-Up     6/7/24 left areolar biopsy.         HPI: This very pleasant 69 y.o. female returns for a routine postoperative appointment.  She had some upper chest pain for five days which upon further questioning was most likely musculoskeletal due to exercise. She denies any fevers or chills. She is scheduled to see Butch POON at [facility] after having a poor experience with a different dermatologist.     Allergies   Allergen Reactions    Cardizem Unspecified     Can cause the heart to stop for 2 seconds     Diltiazem Hcl Unspecified     Can cause the heart to stop for 2 seconds      Current Outpatient Medications   Medication Sig    CRANBERRY PO Take  by mouth.    nadolol (CORGARD) 40 MG Tab Take 1 Tablet by mouth every day.    traMADol (ULTRAM) 50 MG Tab     Levothyroxine Sodium (TIROSINT) 150 MCG Cap Take 1 Tab by mouth every day.    loratadine (CLARITIN) 10 MG Tab Take 10 mg by mouth every day.    EVENING PRIMROSE OIL PO Take 1 Cap by mouth every day.    vitamin e (VITAMIN E) 400 UNIT Cap Take 400 Units by mouth every day.    Calcium Citrate-Vitamin D (CALCIUM CITRATE +D PO) Take  by mouth.    Vitamin D, Cholecalciferol, 50 MCG (2000 UT) Cap Take 2,000 Units by mouth every day.    liothyronine (CYTOMEL) 25 MCG TABS Take 12.5 mcg by mouth every day.       Physical Exam:  /84 (BP Location: Left arm, Patient Position: Sitting, BP Cuff Size: Large adult)   Pulse 63   Temp 37 °C (98.6 °F) (Temporal)   Ht 1.689 m (5' 6.5\")   Wt 81.7 kg (180 lb 3.2 oz)   SpO2 91%   BMI 28.65 kg/m²     General: Very pleasant demeanor.  Appears well, no acute distress.  Surgical site: Right excisional biopsy site healing well. Suture trimmed.  No infection or hematoma.       1. Skin lesion of breast            ASSESSMENT:  Pathology reviewed " with patient. Patient informed to   Bilateral areolar rash, left worse than right, with relative sparing of the nipple.  Severe . Recurs when steroid creams are discontinued. Excisional biopsy 24: Superficial perivascular and interstitial dermatitis with spongiosis and rare scattered eosinophils. No fungal elements, no malignancy. DDx includes allergic contact dermatitis, eczema, drug reaction, urticaria, and arthropod bite.     Last bilateral mammogram 3/14/24 RDC:  Fatty parenchyma.  Last prior mammo .     FH:  Brother prostate cancer 63.  No AJ heritage.       Menopausal/HRT status:  .  Menarche 11. LMP .  No HRT.       LIFESTYLE:  Quit social smoking .  No alcohol or drug use.  BMI 27.       ECOG 0= Fully active, able to carry on all pre-disease performance without restriction.      Superficial perivascular and interstitial dermatitis     DISCUSSED/PLAN:  We will plan to see her back in two weeks for another wound check examination.     I discussed importance of keeping her dermatology appointment.  With regards to her recommended annual surveillance, we recommend routine follow up including annual mammogram with Laina Adams CNM.     REFERRALS:    None

## 2024-06-26 ENCOUNTER — OFFICE VISIT (OUTPATIENT)
Dept: SURGERY | Facility: MEDICAL CENTER | Age: 69
End: 2024-06-26
Payer: MEDICARE

## 2024-06-26 VITALS
DIASTOLIC BLOOD PRESSURE: 84 MMHG | OXYGEN SATURATION: 91 % | WEIGHT: 180.2 LBS | HEART RATE: 63 BPM | BODY MASS INDEX: 28.28 KG/M2 | TEMPERATURE: 98.6 F | SYSTOLIC BLOOD PRESSURE: 128 MMHG | HEIGHT: 67 IN

## 2024-06-26 DIAGNOSIS — L98.8 SKIN LESION OF BREAST: ICD-10-CM

## 2024-06-26 PROCEDURE — 3079F DIAST BP 80-89 MM HG: CPT | Performed by: SPECIALIST

## 2024-06-26 PROCEDURE — 99024 POSTOP FOLLOW-UP VISIT: CPT | Performed by: SPECIALIST

## 2024-06-26 PROCEDURE — 3074F SYST BP LT 130 MM HG: CPT | Performed by: SPECIALIST

## 2024-06-26 ASSESSMENT — FIBROSIS 4 INDEX: FIB4 SCORE: 1.08

## 2024-07-01 ENCOUNTER — TELEPHONE (OUTPATIENT)
Dept: SURGERY | Facility: MEDICAL CENTER | Age: 69
End: 2024-07-01
Payer: MEDICARE

## 2024-07-01 ENCOUNTER — OFFICE VISIT (OUTPATIENT)
Dept: SURGERY | Facility: MEDICAL CENTER | Age: 69
End: 2024-07-01
Payer: MEDICARE

## 2024-07-01 VITALS
TEMPERATURE: 97.3 F | WEIGHT: 179 LBS | HEIGHT: 66 IN | DIASTOLIC BLOOD PRESSURE: 87 MMHG | OXYGEN SATURATION: 98 % | SYSTOLIC BLOOD PRESSURE: 130 MMHG | BODY MASS INDEX: 28.77 KG/M2 | HEART RATE: 78 BPM

## 2024-07-01 DIAGNOSIS — L98.8 SKIN LESION OF BREAST: ICD-10-CM

## 2024-07-01 PROCEDURE — 99024 POSTOP FOLLOW-UP VISIT: CPT

## 2024-07-01 ASSESSMENT — FIBROSIS 4 INDEX: FIB4 SCORE: 1.08

## 2024-07-17 ENCOUNTER — OFFICE VISIT (OUTPATIENT)
Dept: SURGERY | Facility: MEDICAL CENTER | Age: 69
End: 2024-07-17
Payer: MEDICARE

## 2024-07-17 VITALS
OXYGEN SATURATION: 94 % | HEIGHT: 66 IN | TEMPERATURE: 97.1 F | HEART RATE: 61 BPM | BODY MASS INDEX: 28.93 KG/M2 | SYSTOLIC BLOOD PRESSURE: 141 MMHG | DIASTOLIC BLOOD PRESSURE: 72 MMHG | WEIGHT: 180 LBS

## 2024-07-17 DIAGNOSIS — L98.8 SKIN LESION OF BREAST: ICD-10-CM

## 2024-07-17 PROCEDURE — 99024 POSTOP FOLLOW-UP VISIT: CPT

## 2024-07-17 ASSESSMENT — FIBROSIS 4 INDEX: FIB4 SCORE: 1.08

## 2024-07-24 LAB — EKG IMPRESSION: NORMAL

## 2024-08-13 NOTE — PROGRESS NOTES
"    8/14/2024  8:33 AM    Primary care provider:  Dianne Maurer M.D.  Gynecologist:  Dr. Herminia Mcgee   Imaging facility:  [facility]     CC:   Chief Complaint   Patient presents with    Follow-Up     Follow up to wound check        HPI: This very pleasant 69 y.o. female returns for routine followup on her biopsy site. She saw CLEVE Rae who diagnosed her breast lesion at 9 o'clock on the left side as a seborrheic keratosis. She also felt the nipple biopsy area was eczema. Emily was prescribed Epiceram, and this has helped her healing process tremendously. She feels that it is healing. She also places in on the seborrheic keratosis and this helps with the itching. She has also even used it on her a part of her skin underneath the finger nail and it has healed. During her dermatology appointment, she also had some other areas \"freezed\". She is very happy with her dermatology appointment. She denies any breast pain, nipple discharge, skin changes, masses, and changes in contour or nipple changes.     LABS:  Lab Results   Component Value Date/Time    HBA1C 5.6 06/07/2024 12:00 AM          Allergies   Allergen Reactions    Cardizem Unspecified     Can cause the heart to stop for 2 seconds     Diltiazem Hcl Unspecified     Can cause the heart to stop for 2 seconds      Current Outpatient Medications   Medication Sig    traZODone (DESYREL) 50 MG Tab     CRANBERRY PO Take  by mouth.    nadolol (CORGARD) 40 MG Tab Take 1 Tablet by mouth every day.    traMADol (ULTRAM) 50 MG Tab     Levothyroxine Sodium (TIROSINT) 150 MCG Cap Take 1 Tab by mouth every day.    loratadine (CLARITIN) 10 MG Tab Take 10 mg by mouth every day.    EVENING PRIMROSE OIL PO Take 1 Cap by mouth every day.    vitamin e (VITAMIN E) 400 UNIT Cap Take 400 Units by mouth every day.    Calcium Citrate-Vitamin D (CALCIUM CITRATE +D PO) Take  by mouth.    Vitamin D, Cholecalciferol, 50 MCG (2000 UT) Cap Take 2,000 Units by mouth every day.    liothyronine " (CYTOMEL) 25 MCG TABS Take 12.5 mcg by mouth every day.       Patient Active Problem List   Diagnosis    Congenital hypothyroidism with diffuse goiter    Paroxysmal atrial fibrillation (HCC)    Low back pain    H/O cardiac radiofrequency ablation- PVI Dr. Blair 10/2020    Gastroesophageal reflux disease    Personal history of peptic ulcer disease    Skin lesion of breast     Past Surgical History:   Procedure Laterality Date    OTHER NEUROLOGICAL SURG      cervical fusion    LAMINOTOMY      acdf    OTHER CARDIAC SURGERY      Cardiac ablation     TONSILLECTOMY         Social History     Tobacco Use    Smoking status: Former     Current packs/day: 0.00     Average packs/day: 0.8 packs/day for 15.0 years (11.3 ttl pk-yrs)     Types: Cigarettes     Start date:      Quit date:      Years since quittin.6    Smokeless tobacco: Never   Vaping Use    Vaping status: Never Used   Substance Use Topics    Alcohol use: No    Drug use: No     Family and Occupational History     Socioeconomic History    Marital status: Single     Spouse name: Not on file    Number of children: Not on file    Years of education: Not on file    Highest education level: Not on file   Occupational History    Not on file     OB History    Para Term  AB Living   1 0 0 0 1 0   SAB IAB Ectopic Molar Multiple Live Births   1 0 0 0 0 0   Obstetric Comments   Menarche: 11   LMP: 2014, No HRT       Family History   Problem Relation Age of Onset    Other Brother         explained syncopal episodes.     Prostate cancer Brother     Heart Attack Nephew 30       Review of Systems   Constitutional: Negative.    HENT: Negative.     Eyes: Negative.    Respiratory: Negative.     Cardiovascular: Negative.    Gastrointestinal: Negative.    Genitourinary: Negative.    Musculoskeletal:  Positive for back pain.        Upper and lower back pain   Skin: Negative.    Neurological: Negative.    Psychiatric/Behavioral:  The patient is  "nervous/anxious.        Objective:  BP (!) 158/85 (BP Location: Right arm, Patient Position: Sitting, BP Cuff Size: Large adult)   Pulse (!) 59   Temp 36.3 °C (97.3 °F) (Temporal)   Ht 1.676 m (5' 6\")   Wt 79.8 kg (176 lb)   SpO2 95%   BMI 28.41 kg/m²     Physical Exam  Vitals reviewed.   Constitutional:       Appearance: Normal appearance.   HENT:      Head: Normocephalic and atraumatic.      Right Ear: External ear normal.      Left Ear: External ear normal.      Nose: Nose normal.   Eyes:      General: No scleral icterus.     Conjunctiva/sclera: Conjunctivae normal.   Cardiovascular:      Rate and Rhythm: Normal rate and regular rhythm.   Pulmonary:      Effort: Pulmonary effort is normal.      Breath sounds: Normal breath sounds.   Chest:          Comments: BREAST: breasts symmetrical, left breast with approximately 3 cm brownish papule (seborrheic keratosis), left breast 9 o'clock dark red pigmented lesion approximately 3 mm with epithelium healed with slight divot, no signs of infeciton, bilateral breast with no erythema, no palpable masses bilaterally, no nipple discharge or nipple inversion, no dimpling, peau de orange, no bilateral axillary lymphadenopathy    Abdominal:      Palpations: Abdomen is soft.   Musculoskeletal:         General: Normal range of motion.      Cervical back: Normal range of motion.   Neurological:      Mental Status: She is alert and oriented to person, place, and time.   Psychiatric:         Mood and Affect: Mood normal.         Behavior: Behavior normal.       Diagnosis:  1. Skin lesion of breast            ASSESSMENT:  Bilateral areolar rash, left worse than right, with relative sparing of the nipple.  Severe 12/23. Recurs when steroid creams are discontinued. LEFT Excisional biopsy 6/7/24: Superficial perivascular and interstitial dermatitis with spongiosis and rare scattered eosinophils. No fungal elements, no malignancy. DDx includes allergic contact dermatitis, eczema, " drug reaction, urticaria, and arthropod bite.      Last bilateral mammogram 3/14/24 RDC:  Fatty parenchyma.  Last prior mammo .     FH:  Brother prostate cancer 63.  No AJ heritage.       Menopausal/HRT status:  .  Menarche 11. LMP .  No HRT.       LIFESTYLE:  Quit social smoking .  No alcohol or drug use.  BMI 27.       ECOG 0= Fully active, able to carry on all pre-disease performance without restriction.      DISCUSSED/PLAN:    Left excision biopsy site appears to be healed. We have discussed continuing with the current wound care regimen as prescribed by the dermatologist as this continues to aid in the healing. She is aware if symptoms or erythema or drainage occur to notify our office as they can signs of infection.    She does not have any breast mass issues or symptoms. Her calculated TC score is 11% based on RDC calculations.   She was given the option to monitor prn or return in two or three months for a follow up on her left excisional biopsy. She elects to proceed with returning in two to three months for a follow up visit.     We have discussed the importance of self breast examination and monitor for changes in breast pain, nipple discharge, skin changes, masses and countour/nipple changes. If these are to occur she is to notify our office. We discussed that scar tissue can be normal, but enlarging lumps should always be assessed.    We have discussed the importance of self breast examination and monitor for changes in breast pain, nipple discharge, skin changes, masses and countour/nipple changes. If these are to occur she is to notify our office. We discussed that scar tissue can be normal, but enlarging lumps should always be assessed.    REFERRALS:  None

## 2024-08-14 ENCOUNTER — APPOINTMENT (OUTPATIENT)
Dept: SURGERY | Facility: MEDICAL CENTER | Age: 69
End: 2024-08-14
Payer: MEDICARE

## 2024-08-14 VITALS
TEMPERATURE: 97.3 F | DIASTOLIC BLOOD PRESSURE: 85 MMHG | SYSTOLIC BLOOD PRESSURE: 158 MMHG | HEART RATE: 59 BPM | OXYGEN SATURATION: 95 % | WEIGHT: 176 LBS | HEIGHT: 66 IN | BODY MASS INDEX: 28.28 KG/M2

## 2024-08-14 DIAGNOSIS — L98.8 SKIN LESION OF BREAST: ICD-10-CM

## 2024-08-14 PROCEDURE — 3079F DIAST BP 80-89 MM HG: CPT | Performed by: SPECIALIST

## 2024-08-14 PROCEDURE — 99024 POSTOP FOLLOW-UP VISIT: CPT | Performed by: SPECIALIST

## 2024-08-14 PROCEDURE — 3077F SYST BP >= 140 MM HG: CPT | Performed by: SPECIALIST

## 2024-08-14 RX ORDER — TRAZODONE HYDROCHLORIDE 50 MG/1
TABLET, FILM COATED ORAL
COMMUNITY
Start: 2024-07-11

## 2024-08-14 ASSESSMENT — FIBROSIS 4 INDEX: FIB4 SCORE: 1.08

## 2024-08-14 ASSESSMENT — ENCOUNTER SYMPTOMS
CONSTITUTIONAL NEGATIVE: 1
RESPIRATORY NEGATIVE: 1
NERVOUS/ANXIOUS: 1
EYES NEGATIVE: 1
NEUROLOGICAL NEGATIVE: 1
GASTROINTESTINAL NEGATIVE: 1
BACK PAIN: 1
CARDIOVASCULAR NEGATIVE: 1

## 2024-08-29 ENCOUNTER — HOSPITAL ENCOUNTER (OUTPATIENT)
Dept: CARDIOLOGY | Facility: MEDICAL CENTER | Age: 69
End: 2024-08-29
Attending: NURSE PRACTITIONER
Payer: MEDICARE

## 2024-08-29 DIAGNOSIS — I48.0 PAROXYSMAL ATRIAL FIBRILLATION (HCC): ICD-10-CM

## 2024-08-29 LAB
LV EJECT FRACT  99904: 60
LV EJECT FRACT MOD 2C 99903: 57.29
LV EJECT FRACT MOD 4C 99902: 58.54
LV EJECT FRACT MOD BP 99901: 59.23

## 2024-08-29 PROCEDURE — 93306 TTE W/DOPPLER COMPLETE: CPT

## 2024-09-09 ENCOUNTER — TELEPHONE (OUTPATIENT)
Dept: CARDIOLOGY | Facility: MEDICAL CENTER | Age: 69
End: 2024-09-09
Payer: MEDICARE

## 2024-09-09 DIAGNOSIS — I48.0 PAF (PAROXYSMAL ATRIAL FIBRILLATION) (HCC): ICD-10-CM

## 2024-09-10 RX ORDER — NADOLOL 40 MG/1
40 TABLET ORAL DAILY
Qty: 90 TABLET | Refills: 2 | Status: SHIPPED | OUTPATIENT
Start: 2024-09-10

## 2024-09-10 NOTE — TELEPHONE ENCOUNTER
FANI    Caller: Emily Peter    Topic/issue: MEDICATION MANAGEMENT    Emily states that she will need her medication NADOLOL sent to the local pharmacy due to cost:    [id number] - [address] [[id number]]     Please advise.    Thank you,  Hema HURLEY    Callback Number: [phone]

## 2025-01-03 ENCOUNTER — TELEPHONE (OUTPATIENT)
Dept: SURGERY | Facility: MEDICAL CENTER | Age: 70
End: 2025-01-03
Payer: MEDICARE

## 2025-01-03 NOTE — TELEPHONE ENCOUNTER
Spoke to patient about making a High Risk appointment with Shira John PA-C if she would like. She was driving and will call back to discuss further. Asked that she call Dr. Aguilar's office number and ask for me, Benson.   (ROXY Knowles)  [phone]

## 2025-01-16 ENCOUNTER — OFFICE VISIT (OUTPATIENT)
Dept: URGENT CARE | Facility: CLINIC | Age: 70
End: 2025-01-16
Payer: MEDICARE

## 2025-01-16 ENCOUNTER — HOSPITAL ENCOUNTER (OUTPATIENT)
Facility: MEDICAL CENTER | Age: 70
End: 2025-01-16
Attending: PHYSICIAN ASSISTANT
Payer: MEDICARE

## 2025-01-16 ENCOUNTER — OFFICE VISIT (OUTPATIENT)
Dept: SURGERY | Facility: MEDICAL CENTER | Age: 70
End: 2025-01-16
Payer: MEDICARE

## 2025-01-16 VITALS
HEIGHT: 66 IN | DIASTOLIC BLOOD PRESSURE: 82 MMHG | TEMPERATURE: 97.1 F | WEIGHT: 176 LBS | OXYGEN SATURATION: 96 % | HEART RATE: 61 BPM | BODY MASS INDEX: 28.28 KG/M2 | SYSTOLIC BLOOD PRESSURE: 154 MMHG

## 2025-01-16 VITALS
WEIGHT: 180 LBS | DIASTOLIC BLOOD PRESSURE: 60 MMHG | SYSTOLIC BLOOD PRESSURE: 118 MMHG | HEART RATE: 63 BPM | RESPIRATION RATE: 18 BRPM | OXYGEN SATURATION: 97 % | HEIGHT: 67 IN | BODY MASS INDEX: 28.25 KG/M2 | TEMPERATURE: 97.8 F

## 2025-01-16 DIAGNOSIS — B37.9 ANTIBIOTIC-INDUCED YEAST INFECTION: ICD-10-CM

## 2025-01-16 DIAGNOSIS — R30.0 DYSURIA: ICD-10-CM

## 2025-01-16 DIAGNOSIS — L98.8 SKIN LESION OF BREAST: ICD-10-CM

## 2025-01-16 DIAGNOSIS — T36.95XA ANTIBIOTIC-INDUCED YEAST INFECTION: ICD-10-CM

## 2025-01-16 DIAGNOSIS — N64.4 BREAST TENDERNESS IN FEMALE: ICD-10-CM

## 2025-01-16 DIAGNOSIS — N30.00 ACUTE CYSTITIS WITHOUT HEMATURIA: ICD-10-CM

## 2025-01-16 LAB
APPEARANCE UR: CLEAR
BILIRUB UR STRIP-MCNC: NEGATIVE MG/DL
COLOR UR AUTO: YELLOW
GLUCOSE UR STRIP.AUTO-MCNC: NEGATIVE MG/DL
KETONES UR STRIP.AUTO-MCNC: NEGATIVE MG/DL
LEUKOCYTE ESTERASE UR QL STRIP.AUTO: NORMAL
NITRITE UR QL STRIP.AUTO: NEGATIVE
PH UR STRIP.AUTO: 5.5 [PH] (ref 5–8)
PROT UR QL STRIP: NEGATIVE MG/DL
RBC UR QL AUTO: NORMAL
SP GR UR STRIP.AUTO: 1.02
UROBILINOGEN UR STRIP-MCNC: 0.2 MG/DL

## 2025-01-16 PROCEDURE — 87077 CULTURE AEROBIC IDENTIFY: CPT

## 2025-01-16 PROCEDURE — G2212 PROLONG OUTPT/OFFICE VIS: HCPCS | Performed by: SPECIALIST

## 2025-01-16 PROCEDURE — 99214 OFFICE O/P EST MOD 30 MIN: CPT | Performed by: PHYSICIAN ASSISTANT

## 2025-01-16 PROCEDURE — 99215 OFFICE O/P EST HI 40 MIN: CPT | Performed by: SPECIALIST

## 2025-01-16 PROCEDURE — 87086 URINE CULTURE/COLONY COUNT: CPT

## 2025-01-16 PROCEDURE — 3074F SYST BP LT 130 MM HG: CPT | Performed by: PHYSICIAN ASSISTANT

## 2025-01-16 PROCEDURE — 3079F DIAST BP 80-89 MM HG: CPT | Performed by: SPECIALIST

## 2025-01-16 PROCEDURE — 3077F SYST BP >= 140 MM HG: CPT | Performed by: SPECIALIST

## 2025-01-16 PROCEDURE — 81002 URINALYSIS NONAUTO W/O SCOPE: CPT | Performed by: PHYSICIAN ASSISTANT

## 2025-01-16 PROCEDURE — 87186 SC STD MICRODIL/AGAR DIL: CPT

## 2025-01-16 PROCEDURE — 3078F DIAST BP <80 MM HG: CPT | Performed by: PHYSICIAN ASSISTANT

## 2025-01-16 RX ORDER — FLUCONAZOLE 150 MG/1
TABLET ORAL
Qty: 2 TABLET | Refills: 0 | Status: SHIPPED | OUTPATIENT
Start: 2025-01-16

## 2025-01-16 RX ORDER — CEFDINIR 300 MG/1
300 CAPSULE ORAL 2 TIMES DAILY
Qty: 14 CAPSULE | Refills: 0 | Status: SHIPPED | OUTPATIENT
Start: 2025-01-16 | End: 2025-01-23

## 2025-01-16 ASSESSMENT — ENCOUNTER SYMPTOMS
EYES NEGATIVE: 1
NERVOUS/ANXIOUS: 1
CARDIOVASCULAR NEGATIVE: 1
HEMATOLOGIC/LYMPHATIC NEGATIVE: 1
NECK PAIN: 1
DEPRESSION: 1
CONSTITUTIONAL NEGATIVE: 1
BACK PAIN: 1
DIZZINESS: 1
ABDOMINAL PAIN: 1
ROS SKIN COMMENTS: PLEASE SEE HPI
RESPIRATORY NEGATIVE: 1

## 2025-01-16 ASSESSMENT — FIBROSIS 4 INDEX
FIB4 SCORE: 1.08
FIB4 SCORE: 1.08

## 2025-01-16 NOTE — PROGRESS NOTES
2025  8:10 AM    Imaging facility:  [facility]  Primary care provider:  Dianne Maurer M.D.  Gynecologist:  Ashwini Mcgee M.D.   Dermatologist: Sadie Rae PA-C  Cardiologist: Veronica Blair M.D..    CC:   Chief Complaint   Patient presents with    Follow-Up     High Risk Follow up        HPI: This very pleasant 69 y.o. year old female who presents for evaluation of a lesion on the left nipple which previously had been biopsied on 2024 and demonstrated superficial perivascular and interstitial dermatitis with spongiosis and rare scattered eosinophils.  No malignancy was identified.  We had last seen her in August after she had been seen by her dermatologist and symptoms continue to improve well.  Thus she was released to return to our office as needed.  She informs me that her excisional biopsy site completely healed in October.  The eczema had completely resolved as well.  In January she developed another lesion on the left side at the biopsy site and also small area at 2:00.  She complained that it is itchy and she has started to use epicurum cream.  She has not seen her dermatologist and is also due for an annual examination with Dr. Dorman.  She is also noticing a similar type of rash on the right side of her arm.  She has been using 1% hydrocortisone cream which is helping with the itching.  She did not use the hydrocortisone cream on the nipple area.  She informs me she is constantly had sensitivity on the left breast.  She also is wearing a different bra than she typically wears.    Routine self breast exams: Yes 2-3 times a week  Breast pain: No   Nipple discharge: No   Skin changes: Yes with itching and what looks like eczema  Masses: No   Contour/nipple changes: No   Previous breast biopsy or surgery: Yes    Age at menarche: 11  Age at menopause: 57    Hormone replacement therapy: No     Family history of cancer: Father lymphoma, paternal uncle lymphoma, brother prostate cancer age 63,  mom lung cancer with brain mets    Lifetime (5-yr) breast cancer risk calculations  AIDEN/Rikki v8: 11.8 % (6.6 %)    LABS:  Lab Results   Component Value Date/Time    HBA1C 5.6 2024 12:00 AM        Patient Active Problem List   Diagnosis    Congenital hypothyroidism with diffuse goiter    Paroxysmal atrial fibrillation (HCC)    Low back pain    H/O cardiac radiofrequency ablation- [facility]VI Dr. Blair 10/2020    Gastroesophageal reflux disease    Personal history of peptic ulcer disease    Skin lesion of breast       Past Surgical History:   Procedure Laterality Date    OTHER NEUROLOGICAL SURG      cervical fusion    LAMINOTOMY      acdf    OTHER CARDIAC SURGERY      Cardiac ablation     TONSILLECTOMY         Allergies   Allergen Reactions    Cardizem Unspecified     Can cause the heart to stop for 2 seconds     Diltiazem Hcl Unspecified     Can cause the heart to stop for 2 seconds        Current Outpatient Medications   Medication Sig    nadolol (CORGARD) 40 MG Tab Take 1 Tablet by mouth every day.    traZODone (DESYREL) 50 MG Tab     CRANBERRY PO Take  by mouth.    traMADol (ULTRAM) 50 MG Tab     Levothyroxine Sodium (TIROSINT) 150 MCG Cap Take 1 Tab by mouth every day.    loratadine (CLARITIN) 10 MG Tab Take 10 mg by mouth every day.    EVENING PRIMROSE OIL PO Take 1 Cap by mouth every day.    vitamin e (VITAMIN E) 400 UNIT Cap Take 400 Units by mouth every day.    Calcium Citrate-Vitamin D (CALCIUM CITRATE +D PO) Take  by mouth.    Vitamin D, Cholecalciferol, 50 MCG (2000 UT) Cap Take 2,000 Units by mouth every day.    liothyronine (CYTOMEL) 25 MCG TABS Take 12.5 mcg by mouth every day.       Social History     Tobacco Use    Smoking status: Former     Current packs/day: 0.00     Average packs/day: 0.8 packs/day for 15.0 years (11.3 ttl pk-yrs)     Types: Cigarettes     Start date:      Quit date:      Years since quittin.0    Smokeless tobacco: Never   Vaping Use    Vaping status:  "Never Used   Substance Use Topics    Alcohol use: No    Drug use: No        Family and Occupational History     Socioeconomic History    Marital status: Single     Spouse name: Not on file    Number of children: Not on file    Years of education: Not on file    Highest education level: Not on file   Occupational History    Not on file       Family History   Problem Relation Age of Onset    Cancer Mother     Cancer Father     Other Brother         explained syncopal episodes.     Prostate cancer Brother     Cancer Paternal Uncle     Heart Attack Nephew 30       OB History    Para Term  AB Living   1 0 0 0 1 0   SAB IAB Ectopic Molar Multiple Live Births   1 0 0 0 0 0   Obstetric Comments   Menarche: 11   LMP: 2014, No HRT       Review of Systems   Constitutional: Negative.    HENT:  Negative.     Eyes: Negative.    Respiratory: Negative.     Cardiovascular: Negative.    Gastrointestinal:  Positive for abdominal pain.        She has started using probiotics   Genitourinary:  Positive for dysuria.         Patient feels symptoms of a urinary tract infection   Musculoskeletal:  Positive for back pain and neck pain.        Chronic back pain and neck pain which improves with stretching   Skin:  Positive for itching and rash.        Please see HPI   Neurological:  Positive for dizziness.        Chronic and has undergone workup and followed up with her cardiologist including workup for sleep apnea   Hematological: Negative.    Psychiatric/Behavioral:  Positive for depression. The patient is nervous/anxious.          Objective:  BP (!) 154/82 (BP Location: Left arm, Patient Position: Sitting, BP Cuff Size: Large adult)   Pulse 61   Temp 36.2 °C (97.1 °F) (Temporal)   Ht 1.676 m (5' 6\")   Wt 79.8 kg (176 lb)   SpO2 96%   BMI 28.41 kg/m²     Physical Exam  Vitals reviewed.   Constitutional:       Appearance: Normal appearance.   HENT:      Head: Normocephalic.      Right Ear: External ear normal.      Left " Ear: External ear normal.      Nose: Nose normal.   Eyes:      General: No scleral icterus.  Cardiovascular:      Rate and Rhythm: Normal rate and regular rhythm.   Pulmonary:      Effort: Pulmonary effort is normal.      Breath sounds: Normal breath sounds.   Chest:      Comments: BREASTS: Bilateral breasts examined in the upright and supine positions.  Left breast with postbiopsy change scarring noted.  Pink macule approximately 0.5 x 0.5 cm noted at incision site.  Small 0.25 x 0.25 cm macule noted at 2:00.  No obvious signs of pigmentation changes border changes or elevation of macules.  No suspicious skin changes (erythema, peau d'orange).  No unexpected contour abnormalities.   Bilateral breast tissue scattered fibroglandular dense with no dominant masses or nodules.  Extreme sensitivity noted on left breast examination.  Bilateral nipples everted without expressible discharge.  No palpable cervical, supraclavicular, or axillary adenopathy bilaterally.see scanned diagram    Musculoskeletal:         General: Normal range of motion.      Cervical back: Normal range of motion.   Skin:     General: Skin is warm.   Neurological:      Mental Status: She is alert and oriented to person, place, and time.   Psychiatric:         Mood and Affect: Mood normal.         Behavior: Behavior normal.           FUNCTIONAL ASSESSMENT  Patient responses to questions:    Have you ever had shoulder surgery or been treated for a shoulder injury that resulted in a loss of range of motion?  No     Are you unable to reach into an upper cabinet and retrieve a cup or plate?  No     Do you have any limitations in daily activities because of your shoulder?  No     Overhead raise test for shoulder flexion:  Normal Range:  150-180 degrees bilaterally      Diagnosis:  1. Skin lesion of breast            ASSESSMENT:  Previously bilateral areolar rash, left worse than right, with relative sparing of the nipple and severe in 12/23.  Recurred when  steroid creams are discontinued. LEFT Excisional biopsy 6/7/24: Superficial perivascular and interstitial dermatitis with spongiosis and rare scattered eosinophils. No fungal elements, no malignancy. DDx includes allergic contact dermatitis, eczema, drug reaction, urticaria, and arthropod bite.  Improved with usage of the Epicerum cream.  Had followed up with ARTUR Meek.  Similar lesion recurred starting January 2025.     Left breast sensitivity chronic however noted to be more typical than usual on exam    Last bilateral mammogram 3/14/24 RDC:  Fatty parenchyma.  Last prior mammo 5/22.      LIFESTYLE:  Quit social smoking 1997.  No alcohol or drug use.  BMI 28.       ECOG 0= Fully active, able to carry on all pre-disease performance without restriction.     DISCUSSED/PLAN:    I had a lengthy discussion with the patient regarding her left breast rash which I suspect is secondary to eczema.  She is aware to schedule an appointment with her dermatologist as soon as possible.  I have discussed that she should discuss with her dermatologist about using 1% hydrocortisone cream on the periareoloar area.  Emily has informed me that using this 1% cream has significantly improved her itching.  She is to continue to use the episerum cream daily.  It is noted that since she has been using this cream the lesion continues to improve.  She is aware to monitor for any worsening of this lesion.  If this is to occur she is to notify our office right away. I have given her handout and specifically reviewed signs and symptoms of Paget's disease.    We also discussed the similar rash on her right arm and she is aware to follow-up with her dermatologist on this as well.    With regards to her left breast severe sensitivity we had a lengthy discussion regarding the role of a diagnostic mammogram versus a screening mammogram.  She she elects to proceed with a diagnostic mammogram with a left whole breast ultrasound.  I have  reached out to Dr. Dorman her gynecologist to ensure that she is comfortable with the location of her mammogram, and there office informed me either location is feasible.  Patient was informed and patient elected to proceed with imaging at [facility]. .  I have also stressed the patient that she needs to schedule appointment with Dr. Dorman for her annual examination.    Plan will be for her to return after her imaging for clinical follow-up in the next month as well as to discuss results of her mammogram and breast care plan.    I have also discussed her symptoms of  dyspareunia and informed her to be sure to follow-up with her primary care or urgent care  For concerns of UTI and treatment.      External imaging and reports were independently reviewed.  I spent 113  minutes today preparing to see the patient (including chart preparation and review), obtaining and reviewing additional medical history, performing a physical exam and evaluation, documenting clinical information in the electronic health record, independently interpreting results, communicating results to the patient, and coordinating care.  Over 60 minutes spent in face-to-face time.

## 2025-01-17 ASSESSMENT — ENCOUNTER SYMPTOMS
COUGH: 0
SORE THROAT: 0
FLANK PAIN: 0
ABDOMINAL PAIN: 1
MYALGIAS: 0
VOMITING: 0
BACK PAIN: 0
EYE REDNESS: 0
FEVER: 0
NAUSEA: 0
EYE DISCHARGE: 0
ROS GI COMMENTS: SUPRAPUBIC PRESSURE
WHEEZING: 0
HEADACHES: 0

## 2025-01-17 NOTE — PROGRESS NOTES
"Subjective     Emily Peter is a 69 y.o. female who presents with UTI (Burning, x few days)            Patient is a pleasant 69-year-old female presents to urgent care with dysuria off and on for the last week.  Patient reports that she has been attempting to increase her fluid intake however today patient had worsening symptoms of dysuria and vaginal irritation.  She does update me and reports she recently completed a course of Augmentin which she does report history of antibiotic induced yeast infections however denies current white vaginal discharge.  Denies any back pain, fevers, chills or visualization of blood in her urine.  She does report lower abdominal pressure however denies any at this time.    UTI  This is a new problem. The current episode started in the past 7 days. Associated symptoms include abdominal pain. Pertinent negatives include no chest pain, congestion, coughing, fever, headaches, myalgias, nausea, rash, sore throat or vomiting.       Review of Systems   Constitutional:  Negative for fever and malaise/fatigue.   HENT:  Negative for congestion and sore throat.    Eyes:  Negative for discharge and redness.   Respiratory:  Negative for cough and wheezing.    Cardiovascular:  Negative for chest pain.   Gastrointestinal:  Positive for abdominal pain. Negative for nausea and vomiting.        Suprapubic pressure   Genitourinary:  Positive for dysuria, frequency and urgency. Negative for flank pain and hematuria.   Musculoskeletal:  Negative for back pain and myalgias.   Skin:  Negative for itching and rash.   Neurological:  Negative for headaches.              Objective     /60   Pulse 63   Temp 36.6 °C (97.8 °F) (Temporal)   Resp 18   Ht 1.689 m (5' 6.5\")   Wt 81.6 kg (180 lb)   SpO2 97%   BMI 28.62 kg/m²    PMH:  has a past medical history of Anesthesia, Arthritis, Atrial fibrillation (HCC), Cataract (10/2020), Hypothyroid, and Pain.  MEDS: Reviewed .   ALLERGIES:   Allergies "   Allergen Reactions    Cardizem Unspecified     Can cause the heart to stop for 2 seconds     Diltiazem Hcl Unspecified     Can cause the heart to stop for 2 seconds      SURGHX:   Past Surgical History:   Procedure Laterality Date    OTHER NEUROLOGICAL SURG  2003    cervical fusion    LAMINOTOMY      acdf    OTHER CARDIAC SURGERY      Cardiac ablation 2022    TONSILLECTOMY       SOCHX:  reports that she quit smoking about 27 years ago. Her smoking use included cigarettes. She started smoking about 42 years ago. She has a 11.3 pack-year smoking history. She has never used smokeless tobacco. She reports that she does not drink alcohol and does not use drugs.  FH: Family history was reviewed, no pertinent findings to report    Physical Exam  Vitals reviewed.   Constitutional:       Appearance: She is well-developed.   HENT:      Head: Normocephalic and atraumatic.   Eyes:      Pupils: Pupils are equal, round, and reactive to light.   Cardiovascular:      Rate and Rhythm: Normal rate and regular rhythm.      Heart sounds: No murmur heard.  Pulmonary:      Effort: Pulmonary effort is normal. No respiratory distress.      Breath sounds: Normal breath sounds.   Abdominal:      General: Bowel sounds are normal. There is no distension.      Palpations: Abdomen is soft.      Comments:  Negative CVAT.    Musculoskeletal:         General: Normal range of motion.      Cervical back: Normal range of motion and neck supple.   Skin:     General: Skin is warm and dry.   Neurological:      Mental Status: She is alert and oriented to person, place, and time.   Psychiatric:         Behavior: Behavior normal.                             Assessment & Plan        Assessment & Plan  Acute cystitis without hematuria    Orders:    URINE CULTURE(NEW); Future    cefdinir (OMNICEF) 300 MG Cap; Take 1 Capsule by mouth 2 times a day for 7 days.    Dysuria    Orders:    POCT Urinalysis    URINE CULTURE(NEW); Future    cefdinir (OMNICEF) 300 MG  Cap; Take 1 Capsule by mouth 2 times a day for 7 days.    Antibiotic-induced yeast infection    Orders:    fluconazole (DIFLUCAN) 150 MG tablet; Take (1) tab po today, May repeat in 72 hours.              MDM/ Plan /Discussion:    Urinalysis: Trace blood with small leukocyte Estrace-this was sent for culture today.  Based on symptoms along with findings on urinalysis we will start the patient on cefdinir at this time however do suspect that patient may also have a yeast component as well due to recent course of Augmentin as well will write for Diflucan for yeast coverage if needed as well.  I will follow-up with this patient via MyChart as results return for urine culture.  No evidence of pyelonephritis at this time.  Also of note previous CMP reviewed from approximately 6 months ago renal function is appropriate no need to renally adjust.    Differential diagnosis, natural history, supportive care, and indications for immediate follow-up discussed. Side effects of OTC or prescribed medications discussed.      Follow-up as needed if symptoms worsen or fail to improve to PCP, Urgent care or Emergency Room.     I have personally reviewed prior external notes and test results pertinent to today's visit.  I have independently reviewed and interpreted all diagnostics ordered during this urgent care acute visit.   Discussed management options (risks,benefits, and alternatives to treatment).    The patient and/or guardian demonstrated a good understanding and agreed with the treatment plan. And all questions were answered.  Please note that this dictation was created using voice recognition software. I have made a reasonable attempt to correct obvious errors, but I expect that there are errors of grammar and possibly content that I did not discover before finalizing the note.

## 2025-01-18 LAB
BACTERIA UR CULT: ABNORMAL
BACTERIA UR CULT: ABNORMAL
SIGNIFICANT IND 70042: ABNORMAL
SITE SITE: ABNORMAL
SOURCE SOURCE: ABNORMAL

## 2025-02-06 ENCOUNTER — TELEPHONE (OUTPATIENT)
Dept: SURGERY | Facility: MEDICAL CENTER | Age: 70
End: 2025-02-06
Payer: MEDICARE

## 2025-02-06 ENCOUNTER — HOSPITAL ENCOUNTER (OUTPATIENT)
Dept: RADIOLOGY | Facility: MEDICAL CENTER | Age: 70
End: 2025-02-06
Attending: SPECIALIST
Payer: MEDICARE

## 2025-02-06 DIAGNOSIS — L98.8 SKIN LESION OF BREAST: ICD-10-CM

## 2025-02-06 DIAGNOSIS — N64.4 BREAST TENDERNESS IN FEMALE: ICD-10-CM

## 2025-02-06 PROCEDURE — G0279 TOMOSYNTHESIS, MAMMO: HCPCS

## 2025-02-06 NOTE — TELEPHONE ENCOUNTER
I spoke with pt re: no evidence of malignancy on diagnostic bilateral mammogram after reviewing her imaging. She is unable to get in to see her dermatologist until April, but got the Epicerum cream through a specialty pharmacy (regular pharmacy 5000, speciality 45). The rash is continuing to improve. We will plan for clinical examination on the 20th.

## 2025-02-07 NOTE — PROGRESS NOTES
I spoke with pt yesterday abt dx mammogram and BIRADS 2 and she has a follow up apptmt with our office in the 20th

## 2025-02-20 ENCOUNTER — OFFICE VISIT (OUTPATIENT)
Dept: SURGERY | Facility: MEDICAL CENTER | Age: 70
End: 2025-02-20
Payer: MEDICARE

## 2025-02-20 VITALS
DIASTOLIC BLOOD PRESSURE: 75 MMHG | BODY MASS INDEX: 28.94 KG/M2 | WEIGHT: 184.4 LBS | HEART RATE: 61 BPM | TEMPERATURE: 97.6 F | HEIGHT: 67 IN | SYSTOLIC BLOOD PRESSURE: 130 MMHG | OXYGEN SATURATION: 92 %

## 2025-02-20 DIAGNOSIS — L98.8 SKIN LESION OF BREAST: ICD-10-CM

## 2025-02-20 PROCEDURE — 99215 OFFICE O/P EST HI 40 MIN: CPT | Performed by: SPECIALIST

## 2025-02-20 PROCEDURE — 3078F DIAST BP <80 MM HG: CPT | Performed by: SPECIALIST

## 2025-02-20 PROCEDURE — G2212 PROLONG OUTPT/OFFICE VIS: HCPCS | Performed by: SPECIALIST

## 2025-02-20 PROCEDURE — 3075F SYST BP GE 130 - 139MM HG: CPT | Performed by: SPECIALIST

## 2025-02-20 RX ORDER — ESTRADIOL 4 UG/1
INSERT VAGINAL
COMMUNITY
Start: 2025-02-04

## 2025-02-20 ASSESSMENT — ENCOUNTER SYMPTOMS
ENDOCRINE NEGATIVE: 1
CARDIOVASCULAR NEGATIVE: 1
NEUROLOGICAL NEGATIVE: 1
MUSCULOSKELETAL NEGATIVE: 1
HEMATOLOGIC/LYMPHATIC NEGATIVE: 1
RESPIRATORY NEGATIVE: 1
CONSTITUTIONAL NEGATIVE: 1
GASTROINTESTINAL NEGATIVE: 1
PSYCHIATRIC NEGATIVE: 1
EYES NEGATIVE: 1

## 2025-02-20 ASSESSMENT — FIBROSIS 4 INDEX: FIB4 SCORE: 1.08

## 2025-02-20 NOTE — PROGRESS NOTES
2/20/2025  8:34 AM    Imaging facility:  [facility]  Primary care provider:  Dianne Maurer M.D.  Gynecologist:  Ashwini Mcgee M.D.   Dermatologist: Sadie Rae PA-C  Cardiologist: Veronica Blair M.D.    CC:   Chief Complaint   Patient presents with    Follow-Up     2/6 mammo      Interval history:  This very pleasant  69 y.o. year old self-identified female returns for routine followup appointment for a rash on her left areola. She has history of a biopsy of this area on June 7, 2024 that demonstrated superficial perivascular and interstitial dermatitis that was diagnosed as eczema by her dermatologist. This lesion had resolved, but she had presented again on January for a recurrent lesion. She had started to use the Epiceram cream again. She also underwent a bilateral diagnostic mammogram on 2/6/25.  She informs me the lesion continues to improve. She saw Dr. Mcgee who placed her on Imvexxy estrogen vaginal inserts for frequent UTIs. She was goodman when she started them, but this has improved. She also has been having headaches from the vaginal inserts. She just started it about one and a half weeks ago, and will be following up with Dr. Mcgee in three months.       LABS:  Lab Results   Component Value Date/Time    HBA1C 5.6 06/07/2024 12:00 AM        Patient Active Problem List   Diagnosis    Congenital hypothyroidism with diffuse goiter    Paroxysmal atrial fibrillation (HCC)    Low back pain    H/O cardiac radiofrequency ablation- PVI Dr. Blair 10/2020    Gastroesophageal reflux disease    Personal history of peptic ulcer disease    Skin lesion of breast       Past Surgical History:   Procedure Laterality Date    OTHER NEUROLOGICAL SURG  2003    cervical fusion    LAMINOTOMY      acdf    OTHER CARDIAC SURGERY      Cardiac ablation 2022    TONSILLECTOMY         Allergies   Allergen Reactions    Cardizem Unspecified     Can cause the heart to stop for 2 seconds     Diltiazem Hcl Unspecified     Can cause the  heart to stop for 2 seconds        Current Outpatient Medications   Medication Sig    IMVEXXY MAINTENANCE PACK 4 MCG INSERT     fluconazole (DIFLUCAN) 150 MG tablet Take (1) tab po today, May repeat in 72 hours.    nadolol (CORGARD) 40 MG Tab Take 1 Tablet by mouth every day.    traZODone (DESYREL) 50 MG Tab     CRANBERRY PO Take  by mouth.    traMADol (ULTRAM) 50 MG Tab     Levothyroxine Sodium (TIROSINT) 150 MCG Cap Take 1 Tab by mouth every day.    loratadine (CLARITIN) 10 MG Tab Take 10 mg by mouth every day.    EVENING PRIMROSE OIL PO Take 1 Cap by mouth every day.    vitamin e (VITAMIN E) 400 UNIT Cap Take 400 Units by mouth every day.    Calcium Citrate-Vitamin D (CALCIUM CITRATE +D PO) Take  by mouth.    Vitamin D, Cholecalciferol, 50 MCG (2000 UT) Cap Take 2,000 Units by mouth every day.    liothyronine (CYTOMEL) 25 MCG TABS Take 12.5 mcg by mouth every day.       Social History     Tobacco Use    Smoking status: Former     Current packs/day: 0.00     Average packs/day: 0.8 packs/day for 15.0 years (11.3 ttl pk-yrs)     Types: Cigarettes     Start date:      Quit date:      Years since quittin.1    Smokeless tobacco: Never   Vaping Use    Vaping status: Never Used   Substance Use Topics    Alcohol use: No    Drug use: No        Family and Occupational History     Socioeconomic History    Marital status: Single     Spouse name: Not on file    Number of children: Not on file    Years of education: Not on file    Highest education level: Not on file   Occupational History    Not on file       Family History   Problem Relation Age of Onset    Cancer Mother     Cancer Father     Other Brother         explained syncopal episodes.     Prostate cancer Brother     Cancer Paternal Uncle     Heart Attack Nephew 30       OB History    Para Term  AB Living   1 0 0 0 1 0   SAB IAB Ectopic Molar Multiple Live Births   1 0 0 0 0 0   Obstetric Comments   Menarche: 11   LMP: 2014, No HRT     Review  "of Systems   Constitutional: Negative.    HENT:           Headaches   Eyes: Negative.    Respiratory: Negative.     Cardiovascular: Negative.    Gastrointestinal: Negative.    Endocrine: Negative.    Genitourinary: Negative.     Musculoskeletal: Negative.    Skin: Negative.    Neurological: Negative.    Hematological: Negative.    Psychiatric/Behavioral: Negative.       IMAGING:  Bilateral diagnostic mammogram 2/6/25 2/6/2025 10:32 AM     HISTORY/REASON FOR EXAM:  Increase sensitivity in the left breast with increased clinical concern and also being followed for skin lesion. Redness and sensitivity left nipple region        TECHNIQUE/EXAM DESCRIPTION AND NUMBER OF VIEWS:  Bilateral tomosynthesis diagnostic mammography was performed with standard mammographic images generated from the data set. Images were reviewed and interpreted with CAD.     COMPARISON:   03/14/2024     FINDINGS:  There are scattered areas of fibroglandular density.  There are benign appearing calcifications. No dominant mass or abnormal calcification or architectural distortion is identified. No abnormalities are noted in the retroareolar area of the right or left breast.     IMPRESSION:     1.  No change since prior mammogram.     2.  No mammographic evidence of malignancy.     3.  Patient was instructed that if symptoms worsen or palpable abnormality develops, to seek further evaluation from her physician, and additional imaging can be performed at that time, if clinically indicated.  Otherwise recommend annual screening   mammography.     These results were given to the patient at the time of visit.     BI-RADS Category: R2 - CATEGORY 2: BENIGN FINDING(S)    Objective:  /75 (BP Location: Right arm, Patient Position: Sitting, BP Cuff Size: Large adult)   Pulse 61   Temp 36.4 °C (97.6 °F) (Temporal)   Ht 1.689 m (5' 6.5\")   Wt 83.6 kg (184 lb 6.4 oz)   SpO2 92%   BMI 29.32 kg/m²     Physical Exam  Vitals reviewed.   Constitutional: "       Appearance: Normal appearance.   HENT:      Head: Normocephalic and atraumatic.      Right Ear: External ear normal.      Left Ear: External ear normal.      Nose: Nose normal.      Mouth/Throat:      Pharynx: Oropharynx is clear.   Eyes:      General: No scleral icterus.  Cardiovascular:      Rate and Rhythm: Normal rate and regular rhythm.   Pulmonary:      Effort: Pulmonary effort is normal.      Breath sounds: Normal breath sounds.      Comments: BREAST: Left breast with postbiopsy change scarring noted.  Pink macule approximately 0.5 x 0.5 cm noted at incision site resolved. Small 0.15 x 0.15 cm macule noted at 2:00.  No obvious signs of pigmentation changes border changes or elevation of macules. No suspicious skin changes (erythema, peau d'orange). No unexpected contour abnormalities. Bilateral breast tissue scattered fibroglandular dense with no dominant masses or nodules.  Extreme sensitivity noted on bilateral breast examination.  Bilateral nipples everted without expressible discharge. Left nipple with decreased pigmentation 0.1 x 0.1. No palpable cervical, supraclavicular, or axillary adenopathy bilaterally.see scanned diagram  Abdominal:      Palpations: Abdomen is soft.   Musculoskeletal:         General: Normal range of motion.      Cervical back: Normal range of motion.   Skin:     General: Skin is warm.   Neurological:      Mental Status: She is alert and oriented to person, place, and time.   Psychiatric:         Mood and Affect: Mood normal.         Behavior: Behavior normal.       FUNCTIONAL ASSESSMENT  Patient responses to questions:    Have you ever had shoulder surgery or been treated for a shoulder injury that resulted in a loss of range of motion?  Yes    Are you unable to reach into an upper cabinet and retrieve a cup or plate?  Yes    Do you have any limitations in daily activities because of your shoulder?  Yes    Overhead raise test for shoulder flexion:  Normal Range:  150-180  degrees bilaterally    Diagnosis:  1. Skin lesion of breast            ASSESSMENT:  Previously bilateral areolar rash, left worse than right, with relative sparing of the nipple and severe in 12/23.  Recurred when steroid creams are discontinued. LEFT Excisional biopsy 6/7/24: Superficial perivascular and interstitial dermatitis with spongiosis and rare scattered eosinophils. No fungal elements, no malignancy. DDx includes allergic contact dermatitis, eczema, drug reaction, urticaria, and arthropod bite.  Improved with usage of the Epicerum cream.  Had followed up with Sadie Rae PA-C.  Similar lesion recurred starting January 2025 and resolving with Epicerum. She has a follow up appointment with Sadie Rae.     Left breast sensitivity chronic however noted to be more typical than usual on exam     Last bilateral mammogram 2/6/25 [facility]:  Fatty parenchyma.  Last prior mammo 3/14/24.      LIFESTYLE:  Quit social smoking 1997.  No alcohol or drug use.  BMI 29.       ECOG 0= Fully active, able to carry on all pre-disease performance without restriction.      DISCUSSED/PLAN:    I have discussed monitoring her breast and performing her self breast examinations. She is aware to notify our office if she develops any changes in pigmentation, skin changes, nipples changes, and changes in nipple. She will see Dr. Mcgee in three months for follow up on the discussion with the estrogen vaginal pill.    I discussed the option to return as needed to our office or return to our office in six months for a follow up clinical breast examination. After a great amount of discussion, we will tentatively schedule her for a six months clinical breast examination. If her symptoms have completely resolved and she feels comfortable, she is able to notify our office and cancel the appointment. She has a great deal of anxiety as she has outlived her parents and would like to have more surveillance.     Total time spent today, including  personal review and independent interpretation of available breast imaging, outside records, face to face time, chart documentation, and  was 70 minutes. Over 40 minutes in face to face time.

## 2025-05-22 DIAGNOSIS — I48.0 PAF (PAROXYSMAL ATRIAL FIBRILLATION) (HCC): ICD-10-CM

## 2025-05-22 RX ORDER — NADOLOL 40 MG/1
40 TABLET ORAL DAILY
Qty: 90 TABLET | Refills: 0 | Status: SHIPPED | OUTPATIENT
Start: 2025-05-22

## 2025-05-22 NOTE — TELEPHONE ENCOUNTER
Is the patient due for a refill? Yes    Was the patient seen the last 15 months? Yes    Date of last office visit: 6/6/2024    Does the patient have an upcoming appointment?  No    Provider to refill:JS    Does the patient have [facility] and need 100-day supply? (This applies to ALL medications) Patient does not have SCP